# Patient Record
Sex: FEMALE | Race: WHITE | Employment: OTHER | URBAN - METROPOLITAN AREA
[De-identification: names, ages, dates, MRNs, and addresses within clinical notes are randomized per-mention and may not be internally consistent; named-entity substitution may affect disease eponyms.]

---

## 2017-03-24 RX ORDER — ESCITALOPRAM OXALATE 20 MG/1
TABLET ORAL
Qty: 30 TABLET | Refills: 1 | Status: SHIPPED | OUTPATIENT
Start: 2017-03-24 | End: 2017-05-26 | Stop reason: SDUPTHER

## 2017-04-26 DIAGNOSIS — I15.8 OTHER SECONDARY HYPERTENSION: ICD-10-CM

## 2017-04-26 RX ORDER — TRIAMTERENE AND HYDROCHLOROTHIAZIDE 37.5; 25 MG/1; MG/1
TABLET ORAL
Qty: 90 TABLET | Refills: 1 | Status: SHIPPED | OUTPATIENT
Start: 2017-04-26 | End: 2018-01-09 | Stop reason: SDUPTHER

## 2017-04-26 RX ORDER — ATENOLOL 50 MG/1
TABLET ORAL
Qty: 90 TABLET | Refills: 1 | Status: SHIPPED | OUTPATIENT
Start: 2017-04-26 | End: 2018-01-09 | Stop reason: SDUPTHER

## 2017-05-07 DIAGNOSIS — E03.8 OTHER SPECIFIED HYPOTHYROIDISM: ICD-10-CM

## 2017-05-08 RX ORDER — LEVOTHYROXINE SODIUM 0.07 MG/1
TABLET ORAL
Qty: 90 TABLET | Refills: 0 | Status: SHIPPED | OUTPATIENT
Start: 2017-05-08 | End: 2018-01-09 | Stop reason: SDUPTHER

## 2017-05-26 RX ORDER — ESCITALOPRAM OXALATE 20 MG/1
TABLET ORAL
Qty: 30 TABLET | Refills: 5 | Status: SHIPPED | OUTPATIENT
Start: 2017-05-26 | End: 2017-11-20 | Stop reason: SDUPTHER

## 2017-05-26 RX ORDER — ATORVASTATIN CALCIUM 80 MG/1
TABLET, FILM COATED ORAL
Qty: 90 TABLET | Refills: 1 | Status: SHIPPED | OUTPATIENT
Start: 2017-05-26 | End: 2018-01-09 | Stop reason: SDUPTHER

## 2017-11-20 RX ORDER — ESCITALOPRAM OXALATE 20 MG/1
TABLET ORAL
Qty: 30 TABLET | Refills: 5 | Status: SHIPPED | OUTPATIENT
Start: 2017-11-20 | End: 2018-01-09 | Stop reason: SDUPTHER

## 2018-01-09 ENCOUNTER — OFFICE VISIT (OUTPATIENT)
Dept: FAMILY MEDICINE CLINIC | Age: 68
End: 2018-01-09

## 2018-01-09 VITALS
TEMPERATURE: 97.7 F | DIASTOLIC BLOOD PRESSURE: 82 MMHG | RESPIRATION RATE: 14 BRPM | HEIGHT: 68 IN | SYSTOLIC BLOOD PRESSURE: 124 MMHG | OXYGEN SATURATION: 98 % | HEART RATE: 70 BPM | BODY MASS INDEX: 40.32 KG/M2 | WEIGHT: 266 LBS

## 2018-01-09 DIAGNOSIS — Z12.4 CERVICAL CANCER SCREENING: ICD-10-CM

## 2018-01-09 DIAGNOSIS — R73.9 HYPERGLYCEMIA: ICD-10-CM

## 2018-01-09 DIAGNOSIS — F32.A DEPRESSION, UNSPECIFIED DEPRESSION TYPE: ICD-10-CM

## 2018-01-09 DIAGNOSIS — E03.8 OTHER SPECIFIED HYPOTHYROIDISM: ICD-10-CM

## 2018-01-09 DIAGNOSIS — E78.2 MIXED HYPERLIPIDEMIA: ICD-10-CM

## 2018-01-09 DIAGNOSIS — I15.8 OTHER SECONDARY HYPERTENSION: Primary | ICD-10-CM

## 2018-01-09 DIAGNOSIS — Z91.81 AT HIGH RISK FOR FALLS: ICD-10-CM

## 2018-01-09 DIAGNOSIS — F41.9 ANXIETY: ICD-10-CM

## 2018-01-09 DIAGNOSIS — Z23 NEED FOR PNEUMOCOCCAL VACCINATION: ICD-10-CM

## 2018-01-09 DIAGNOSIS — I15.8 OTHER SECONDARY HYPERTENSION: ICD-10-CM

## 2018-01-09 DIAGNOSIS — Z12.31 ENCOUNTER FOR SCREENING MAMMOGRAM FOR BREAST CANCER: ICD-10-CM

## 2018-01-09 DIAGNOSIS — N18.30 CKD (CHRONIC KIDNEY DISEASE) STAGE 3, GFR 30-59 ML/MIN (HCC): ICD-10-CM

## 2018-01-09 LAB
ALBUMIN SERPL-MCNC: 4.7 G/DL (ref 3.9–4.9)
ALP BLD-CCNC: 149 U/L (ref 40–130)
ALT SERPL-CCNC: 23 U/L (ref 0–33)
ANION GAP SERPL CALCULATED.3IONS-SCNC: 15 MEQ/L (ref 7–13)
AST SERPL-CCNC: 18 U/L (ref 0–35)
BILIRUB SERPL-MCNC: 0.3 MG/DL (ref 0–1.2)
BUN BLDV-MCNC: 26 MG/DL (ref 8–23)
CALCIUM SERPL-MCNC: 10.3 MG/DL (ref 8.6–10.2)
CHLORIDE BLD-SCNC: 95 MEQ/L (ref 98–107)
CHOLESTEROL, TOTAL: 208 MG/DL (ref 0–199)
CO2: 29 MEQ/L (ref 22–29)
CREAT SERPL-MCNC: 1.25 MG/DL (ref 0.5–0.9)
GFR AFRICAN AMERICAN: 51.9
GFR NON-AFRICAN AMERICAN: 42.9
GLOBULIN: 2.8 G/DL (ref 2.3–3.5)
GLUCOSE BLD-MCNC: 91 MG/DL (ref 74–109)
HBA1C MFR BLD: 5.7 % (ref 4.8–5.9)
HCT VFR BLD CALC: 37.7 % (ref 37–47)
HDLC SERPL-MCNC: 55 MG/DL (ref 40–59)
HEMOGLOBIN: 12.1 G/DL (ref 12–16)
LDL CHOLESTEROL CALCULATED: 74 MG/DL (ref 0–129)
MCH RBC QN AUTO: 27.9 PG (ref 27–31.3)
MCHC RBC AUTO-ENTMCNC: 32.1 % (ref 33–37)
MCV RBC AUTO: 86.8 FL (ref 82–100)
PDW BLD-RTO: 15.2 % (ref 11.5–14.5)
PLATELET # BLD: 342 K/UL (ref 130–400)
POTASSIUM SERPL-SCNC: 4.5 MEQ/L (ref 3.5–5.1)
RBC # BLD: 4.34 M/UL (ref 4.2–5.4)
SODIUM BLD-SCNC: 139 MEQ/L (ref 132–144)
T4 FREE: 1.19 NG/DL (ref 0.93–1.7)
TOTAL PROTEIN: 7.5 G/DL (ref 6.4–8.1)
TRIGL SERPL-MCNC: 396 MG/DL (ref 0–200)
TSH SERPL DL<=0.05 MIU/L-ACNC: 2.23 UIU/ML (ref 0.27–4.2)
WBC # BLD: 10.4 K/UL (ref 4.8–10.8)

## 2018-01-09 PROCEDURE — 90471 IMMUNIZATION ADMIN: CPT | Performed by: FAMILY MEDICINE

## 2018-01-09 PROCEDURE — 90670 PCV13 VACCINE IM: CPT | Performed by: FAMILY MEDICINE

## 2018-01-09 PROCEDURE — 99397 PER PM REEVAL EST PAT 65+ YR: CPT | Performed by: FAMILY MEDICINE

## 2018-01-09 RX ORDER — ATORVASTATIN CALCIUM 80 MG/1
80 TABLET, FILM COATED ORAL DAILY
Qty: 90 TABLET | Refills: 3 | Status: SHIPPED | OUTPATIENT
Start: 2018-01-09 | End: 2018-03-29 | Stop reason: SDUPTHER

## 2018-01-09 RX ORDER — ESCITALOPRAM OXALATE 20 MG/1
20 TABLET ORAL DAILY
Qty: 90 TABLET | Refills: 3 | Status: SHIPPED | OUTPATIENT
Start: 2018-01-09 | End: 2018-03-29 | Stop reason: SDUPTHER

## 2018-01-09 RX ORDER — ATENOLOL 50 MG/1
50 TABLET ORAL DAILY
Qty: 90 TABLET | Refills: 3 | Status: SHIPPED | OUTPATIENT
Start: 2018-01-09 | End: 2018-03-29 | Stop reason: SDUPTHER

## 2018-01-09 RX ORDER — LEVOTHYROXINE SODIUM 0.07 MG/1
75 TABLET ORAL DAILY
Qty: 90 TABLET | Refills: 3 | Status: SHIPPED | OUTPATIENT
Start: 2018-01-09 | End: 2018-03-29 | Stop reason: SDUPTHER

## 2018-01-09 RX ORDER — TRIAMTERENE AND HYDROCHLOROTHIAZIDE 37.5; 25 MG/1; MG/1
1 TABLET ORAL DAILY
Qty: 90 TABLET | Refills: 3 | Status: SHIPPED | OUTPATIENT
Start: 2018-01-09 | End: 2018-03-29 | Stop reason: SDUPTHER

## 2018-01-09 ASSESSMENT — PATIENT HEALTH QUESTIONNAIRE - PHQ9
2. FEELING DOWN, DEPRESSED OR HOPELESS: 0
1. LITTLE INTEREST OR PLEASURE IN DOING THINGS: 0
SUM OF ALL RESPONSES TO PHQ9 QUESTIONS 1 & 2: 0
SUM OF ALL RESPONSES TO PHQ QUESTIONS 1-9: 0

## 2018-01-09 NOTE — PROGRESS NOTES
Subjective  Janell Davis, 74 y.o. female presents today with:  Chief Complaint   Patient presents with    Annual Exam     Colon ca screening utd    Hermina Flash on ice no falls otherwise  Mood is good    Past Medical History:   Diagnosis Date    Anxiety     Depression     Hyperlipidemia     Hypertension     Hypothyroidism     Obesity     Osteoarthritis     Palpitations     Skin cancer      Past Surgical History:   Procedure Laterality Date    COLONOSCOPY  11/16/11,5/2015    DR Paul Shah nxt    ENDOMETRIAL BIOPSY      MOHS SURGERY  2011    TONSILLECTOMY AND ADENOIDECTOMY  1955     Social History     Social History    Marital status:      Spouse name: N/A    Number of children: N/A    Years of education: N/A     Occupational History    Not on file. Social History Main Topics    Smoking status: Never Smoker    Smokeless tobacco: Never Used    Alcohol use Yes      Comment: occ.     Drug use: No    Sexual activity: Not on file     Other Topics Concern    Not on file     Social History Narrative    No narrative on file     Family History   Problem Relation Age of Onset    Alzheimer's Disease Mother     Parkinsonism Mother     Heart Disease Mother     Diabetes Father     Arthritis Father     Cancer Father     Heart Disease Father     Cancer Brother      COLON     Allergies   Allergen Reactions    Neosporin Ophthalmic [Neomycin-Polymyx-Gramicid]      Current Outpatient Prescriptions   Medication Sig Dispense Refill    escitalopram (LEXAPRO) 20 MG tablet TAKE ONE TABLET BY MOUTH ONE TIME DAILY 30 tablet 5    atorvastatin (LIPITOR) 80 MG tablet TAKE ONE TABLET BY MOUTH ONE TIME DAILY 90 tablet 1    levothyroxine (SYNTHROID) 75 MCG tablet TAKE ONE TABLET BY MOUTH ONE TIME DAILY 90 tablet 0    atenolol (TENORMIN) 50 MG tablet TAKE ONE TABLET BY MOUTH ONE TIME DAILY 90 tablet 1    triamterene-hydrochlorothiazide (MAXZIDE-25) 37.5-25 MG per tablet TAKE 1 TABLET BY MOUTH DAILY 90 tablet 1    carbonyl iron (FEOSOL) 45 MG TABS Take by mouth See Admin Instructions One a day 3 days per week      Coenzyme Q10 (CO Q 10 PO) Take by mouth      Cholecalciferol (VITAMIN D) 2000 UNITS CAPS capsule Take by mouth      Multiple Vitamins-Minerals (MULTIVITAMIN PO) Take by mouth      aspirin 81 MG tablet Take 81 mg by mouth daily. No current facility-administered medications for this visit. The patient denies any history of      seizures,             heart attack or KNOWN CAD        or stroke. No chest pain, shortness of breath, paroxysmal nocturnal dyspnea. No nausea, vomiting, diarrhea, hematochezia or melena. No paresthesias or headaches. No dysuria, frequency or hematuria. Last labs  No visits with results within 3 Month(s) from this visit.    Latest known visit with results is:   Hospital Outpatient Visit on 04/01/2016   Component Date Value Ref Range Status    WBC 04/01/2016 6.5  4.8 - 10.8 K/uL Final    RBC 04/01/2016 4.05* 4.20 - 5.40 M/uL Final    Hemoglobin 04/01/2016 11.6* 12.0 - 16.0 g/dL Final    Hematocrit 04/01/2016 34.7* 37.0 - 47.0 % Final    MCV 04/01/2016 85.6  82.0 - 100.0 fL Final    MCH 04/01/2016 28.7  27.0 - 31.3 pg Final    MCHC 04/01/2016 33.5  33.0 - 37.0 % Final    RDW 04/01/2016 15.1* 11.5 - 14.5 % Final    Platelets 81/52/2058 285  130 - 400 K/uL Final    Neutrophils % 04/01/2016 71.8  % Final    Lymphocytes % 04/01/2016 13.7  % Final    Monocytes % 04/01/2016 10.5  % Final    Eosinophils % 04/01/2016 2.7  % Final    Basophils % 04/01/2016 1.3  % Final    Neutrophils # 04/01/2016 4.7  1.4 - 6.5 K/uL Final    Lymphocytes # 04/01/2016 0.9* 1.0 - 4.8 K/uL Final    Monocytes # 04/01/2016 0.7  0.2 - 0.8 K/uL Final    Eosinophils # 04/01/2016 0.2  0.0 - 0.7 K/uL Final    Basophils # 04/01/2016 0.1  0.0 - 0.2 K/uL Final    T4 Free 04/01/2016 1.14  0.93 - 1.70 ng/dL Final    TSH 04/01/2016 2.800  0.270 - 4.200 uIU/mL Final bilaterally        Breast exam no masses, lymphadenopathy, rashes or nipple discharge bilaterally. Pap no cmt no discharge    Assessment & Plan   1. Other secondary hypertension  atenolol (TENORMIN) 50 MG tablet    triamterene-hydrochlorothiazide (MAXZIDE-25) 37.5-25 MG per tablet   2. Other specified hypothyroidism  levothyroxine (SYNTHROID) 75 MCG tablet   3. Mixed hyperlipidemia     4. CKD (chronic kidney disease) stage 3, GFR 30-59 ml/min     5. Anxiety     6. Depression, unspecified depression type     7. Encounter for screening mammogram for breast cancer  LOUISE DIGITAL SCREEN W CAD BILATERAL   8. Need for pneumococcal vaccination  Pneumococcal conjugate vaccine 13-valent IM (PREVNAR 13)     Orders Placed This Encounter   Procedures    Pneumococcal conjugate vaccine 13-valent IM (PREVNAR 13)     No orders of the defined types were placed in this encounter. Medications Discontinued During This Encounter   Medication Reason    ANUCORT-HC 25 MG suppository Therapy completed    levothyroxine (SYNTHROID) 75 MCG tablet Therapy completed     No Follow-up on file. Marcos Day is advised to follow up ASAP if condition deteriorates or problems arise and if no information on test results to patient in the next 1 month they are advised to call us. Marcin Rea MD      On the basis of positive falls risk screening, assessment and plan is as follows: home safety tips provided.

## 2018-01-16 LAB
HPV COMMENT: NORMAL
HPV TYPE 16: NOT DETECTED
HPV TYPE 18: NOT DETECTED
HPVOH (OTHER TYPES): NOT DETECTED

## 2018-01-30 ENCOUNTER — HOSPITAL ENCOUNTER (OUTPATIENT)
Dept: WOMENS IMAGING | Age: 68
Discharge: HOME | End: 2018-02-01
Payer: COMMERCIAL

## 2018-01-30 DIAGNOSIS — Z12.31 ENCOUNTER FOR SCREENING MAMMOGRAM FOR BREAST CANCER: ICD-10-CM

## 2018-01-30 PROCEDURE — 77063 BREAST TOMOSYNTHESIS BI: CPT

## 2018-03-29 DIAGNOSIS — E03.8 OTHER SPECIFIED HYPOTHYROIDISM: ICD-10-CM

## 2018-03-29 DIAGNOSIS — I15.8 OTHER SECONDARY HYPERTENSION: ICD-10-CM

## 2018-03-30 RX ORDER — LEVOTHYROXINE SODIUM 0.07 MG/1
75 TABLET ORAL DAILY
Qty: 90 TABLET | Refills: 3 | Status: SHIPPED | OUTPATIENT
Start: 2018-03-30 | End: 2019-02-20 | Stop reason: SDUPTHER

## 2018-03-30 RX ORDER — ATORVASTATIN CALCIUM 80 MG/1
80 TABLET, FILM COATED ORAL DAILY
Qty: 90 TABLET | Refills: 3 | Status: SHIPPED | OUTPATIENT
Start: 2018-03-30 | End: 2019-02-20 | Stop reason: SDUPTHER

## 2018-03-30 RX ORDER — ESCITALOPRAM OXALATE 20 MG/1
20 TABLET ORAL DAILY
Qty: 90 TABLET | Refills: 3 | Status: SHIPPED | OUTPATIENT
Start: 2018-03-30 | End: 2019-02-20 | Stop reason: SDUPTHER

## 2018-03-30 RX ORDER — ATENOLOL 50 MG/1
50 TABLET ORAL DAILY
Qty: 90 TABLET | Refills: 3 | Status: SHIPPED | OUTPATIENT
Start: 2018-03-30 | End: 2019-02-20 | Stop reason: SDUPTHER

## 2018-03-30 RX ORDER — TRIAMTERENE AND HYDROCHLOROTHIAZIDE 37.5; 25 MG/1; MG/1
1 TABLET ORAL DAILY
Qty: 90 TABLET | Refills: 3 | Status: SHIPPED | OUTPATIENT
Start: 2018-03-30 | End: 2019-02-20 | Stop reason: SDUPTHER

## 2018-08-29 ENCOUNTER — OFFICE VISIT (OUTPATIENT)
Dept: FAMILY MEDICINE CLINIC | Age: 68
End: 2018-08-29
Payer: COMMERCIAL

## 2018-08-29 VITALS
HEIGHT: 68 IN | SYSTOLIC BLOOD PRESSURE: 128 MMHG | RESPIRATION RATE: 16 BRPM | HEART RATE: 76 BPM | TEMPERATURE: 98.4 F | DIASTOLIC BLOOD PRESSURE: 64 MMHG | WEIGHT: 265 LBS | BODY MASS INDEX: 40.16 KG/M2

## 2018-08-29 DIAGNOSIS — E78.2 MIXED HYPERLIPIDEMIA: ICD-10-CM

## 2018-08-29 DIAGNOSIS — N18.30 CKD (CHRONIC KIDNEY DISEASE) STAGE 3, GFR 30-59 ML/MIN (HCC): ICD-10-CM

## 2018-08-29 DIAGNOSIS — I15.8 OTHER SECONDARY HYPERTENSION: ICD-10-CM

## 2018-08-29 DIAGNOSIS — I15.8 OTHER SECONDARY HYPERTENSION: Primary | ICD-10-CM

## 2018-08-29 DIAGNOSIS — E03.8 OTHER SPECIFIED HYPOTHYROIDISM: ICD-10-CM

## 2018-08-29 DIAGNOSIS — R73.9 HYPERGLYCEMIA: ICD-10-CM

## 2018-08-29 LAB
ALBUMIN SERPL-MCNC: 4.4 G/DL (ref 3.9–4.9)
ALP BLD-CCNC: 177 U/L (ref 40–130)
ALT SERPL-CCNC: 22 U/L (ref 0–33)
ANION GAP SERPL CALCULATED.3IONS-SCNC: 20 MEQ/L (ref 7–13)
AST SERPL-CCNC: 19 U/L (ref 0–35)
BILIRUB SERPL-MCNC: 0.4 MG/DL (ref 0–1.2)
BUN BLDV-MCNC: 23 MG/DL (ref 8–23)
CALCIUM SERPL-MCNC: 10 MG/DL (ref 8.6–10.2)
CHLORIDE BLD-SCNC: 100 MEQ/L (ref 98–107)
CHOLESTEROL, TOTAL: 191 MG/DL (ref 0–199)
CO2: 24 MEQ/L (ref 22–29)
CREAT SERPL-MCNC: 1.07 MG/DL (ref 0.5–0.9)
GFR AFRICAN AMERICAN: >60
GFR NON-AFRICAN AMERICAN: 51.2
GLOBULIN: 2.9 G/DL (ref 2.3–3.5)
GLUCOSE BLD-MCNC: 99 MG/DL (ref 74–109)
HBA1C MFR BLD: 5.7 % (ref 4.8–5.9)
HCT VFR BLD CALC: 36.5 % (ref 37–47)
HDLC SERPL-MCNC: 55 MG/DL (ref 40–59)
HEMOGLOBIN: 12.3 G/DL (ref 12–16)
LDL CHOLESTEROL CALCULATED: 87 MG/DL (ref 0–129)
MCH RBC QN AUTO: 28.6 PG (ref 27–31.3)
MCHC RBC AUTO-ENTMCNC: 33.6 % (ref 33–37)
MCV RBC AUTO: 85.2 FL (ref 82–100)
PDW BLD-RTO: 15.9 % (ref 11.5–14.5)
PLATELET # BLD: 292 K/UL (ref 130–400)
POTASSIUM SERPL-SCNC: 4.7 MEQ/L (ref 3.5–5.1)
RBC # BLD: 4.28 M/UL (ref 4.2–5.4)
SODIUM BLD-SCNC: 144 MEQ/L (ref 132–144)
T4 FREE: 1.03 NG/DL (ref 0.93–1.7)
TOTAL PROTEIN: 7.3 G/DL (ref 6.4–8.1)
TRIGL SERPL-MCNC: 246 MG/DL (ref 0–200)
TSH SERPL DL<=0.05 MIU/L-ACNC: 3.26 UIU/ML (ref 0.27–4.2)
WBC # BLD: 8.3 K/UL (ref 4.8–10.8)

## 2018-08-29 PROCEDURE — 99214 OFFICE O/P EST MOD 30 MIN: CPT | Performed by: FAMILY MEDICINE

## 2018-08-29 NOTE — PROGRESS NOTES
tablet Take 1 tablet by mouth daily 90 tablet 3    atorvastatin (LIPITOR) 80 MG tablet Take 1 tablet by mouth daily 90 tablet 3    escitalopram (LEXAPRO) 20 MG tablet Take 1 tablet by mouth daily 90 tablet 3    levothyroxine (SYNTHROID) 75 MCG tablet Take 1 tablet by mouth daily 90 tablet 3    carbonyl iron (FEOSOL) 45 MG TABS Take by mouth See Admin Instructions One a day 3 days per week      Coenzyme Q10 (CO Q 10 PO) Take by mouth      Cholecalciferol (VITAMIN D) 2000 UNITS CAPS capsule Take by mouth      Multiple Vitamins-Minerals (MULTIVITAMIN PO) Take by mouth      aspirin 81 MG tablet Take 81 mg by mouth daily. No current facility-administered medications for this visit. The patient denies any history of      seizures,             heart attack or KNOWN CAD        or stroke. No chest pain, shortness of breath, paroxysmal nocturnal dyspnea. No nausea, vomiting, diarrhea, hematochezia or melena. No paresthesias or headaches. No dysuria, frequency or hematuria. Last labs  No visits with results within 3 Month(s) from this visit. Latest known visit with results is:   Orders Only on 01/09/2018   Component Date Value Ref Range Status    Cholesterol, Total 01/09/2018 208* 0 - 199 mg/dL Final    ATP III Cholesterol Classification is Borderline High.  Triglycerides 01/09/2018 396* 0 - 200 mg/dL Final    ATP III Triglycerides Classification is High.  HDL 01/09/2018 55  40 - 59 mg/dL Final    Comment: ATP III HDL Cholesterol Classification is Desirable. Expected Values:    Males:    >55 = No Risk            35-55 = Moderate Risk            <35 = High Risk    Females:  >65 = No Risk            45-65 = Moderate Risk            <45 = High Risk    NCEP Guidelines:   Third Report May 2001  >59 = negative risk factor for CHD  <40 = major risk factor for CHD      LDL Calculated 01/09/2018 74  0 - 129 mg/dL Final    ATP III LDL Classification is Optimal.    WBC 01/09/2018 10.4 4.8 - 10.8 K/uL Final    RBC 01/09/2018 4.34  4.20 - 5.40 M/uL Final    Hemoglobin 01/09/2018 12.1  12.0 - 16.0 g/dL Final    Hematocrit 01/09/2018 37.7  37.0 - 47.0 % Final    MCV 01/09/2018 86.8  82.0 - 100.0 fL Final    MCH 01/09/2018 27.9  27.0 - 31.3 pg Final    MCHC 01/09/2018 32.1* 33.0 - 37.0 % Final    RDW 01/09/2018 15.2* 11.5 - 14.5 % Final    Platelets 00/96/3646 342  130 - 400 K/uL Final    Hemoglobin A1C 01/09/2018 5.7  4.8 - 5.9 % Final    Sodium 01/09/2018 139  132 - 144 mEq/L Final    Comment: Revert to previous reference range. Effective:  12/14/2017      Potassium 01/09/2018 4.5  3.5 - 5.1 mEq/L Final    Comment: Revert to previous reference range. Effective:  12/14/2017      Chloride 01/09/2018 95* 98 - 107 mEq/L Final    Comment: Revert to previous reference range. Effective:  12/14/2017      CO2 01/09/2018 29  22 - 29 mEq/L Final    Comment: Revert to previous reference range. Effective:  12/14/2017      Anion Gap 01/09/2018 15* 7 - 13 mEq/L Final    Comment: Revert to previous reference range. Effective:  12/14/2017      Glucose 01/09/2018 91  74 - 109 mg/dL Final    BUN 01/09/2018 26* 8 - 23 mg/dL Final    CREATININE 01/09/2018 1.25* 0.50 - 0.90 mg/dL Final    GFR Non- 01/09/2018 42.9* >60 Final    Comment: >60 mL/min/1.73m2 EGFR, calc. for ages 25 and older using the  MDRD formula (not corrected for weight), is valid for stable  renal function.  GFR  01/09/2018 51.9* >60 Final    Comment: >60 mL/min/1.73m2 EGFR, calc. for ages 25 and older using the  MDRD formula (not corrected for weight), is valid for stable  renal function.       Calcium 01/09/2018 10.3* 8.6 - 10.2 mg/dL Final    Total Protein 01/09/2018 7.5  6.4 - 8.1 g/dL Final    Alb 01/09/2018 4.7  3.9 - 4.9 g/dL Final    Total Bilirubin 01/09/2018 0.3  0.0 - 1.2 mg/dL Final    Alkaline Phosphatase 01/09/2018 149* 40 - 130 U/L Final    ALT 01/09/2018 23  0 - 33 U/L Final    AST 01/09/2018 18  0 - 35 U/L Final    Globulin 01/09/2018 2.8  2.3 - 3.5 g/dL Final    TSH 01/09/2018 2.230  0.270 - 4.200 uIU/mL Final    T4 Free 01/09/2018 1.19  0.93 - 1.70 ng/dL Final    HPV TYPE 16 01/09/2018 Not Detected  Not Detected Final    HPV TYPE 18 01/09/2018 Not Detected  Not Detected Final    HPVOH (OTHER TYPES) 01/09/2018 Not Detected  Not Detected Final    *Includes 30,45,89,77,31,79,85,05,73,06,77,18 genotypes    HPV Comment 01/09/2018 See below   Final    Comment: **This is information only. See above for results. **    HPV other genotypes: 34,47,89,71,79,68,83,72,52,02,26,33    The Roche [facility] HPV Test is a qualitative in-vitro test for  the detection of Human Papillomavirus that provides specific  genotyping information for HPV Types 16 and 18, while  concurrently detecting 12 other high-risk HPV types 31,33,35,  85,71,64,92,28,68,28,66,68 in a pooled result. The test  utilizes amplification of target DNA by Polymerase Chain  Reaction (PCR) and nucleic acid hybridization. [facility]   Topic Date Due    Hepatitis C screen  1950    Shingles Vaccine (1 of 2 - 2 Dose Series) 07/20/2014    DEXA (modify frequency per FRAX score)  01/09/2019 (Originally 4/19/2017)    DTaP/Tdap/Td vaccine (1 - Tdap) 01/09/2019 (Originally 4/19/1971)    Flu vaccine (1) 09/01/2018    A1C test (Diabetic or Prediabetic)  01/09/2019    Pneumococcal low/med risk (2 of 2 - PPSV23) 01/09/2019    TSH testing  01/09/2019    Potassium monitoring  01/09/2019    Creatinine monitoring  01/09/2019    Breast cancer screen  01/30/2020    Colon cancer screen colonoscopy  05/05/2020    Lipid screen  01/09/2023       No results found for this visit on 08/29/18.       Objective      Wt Readings from Last 3 Encounters:   08/29/18 265 lb (120.2 kg)   01/09/18 266 lb (120.7 kg)   05/03/16 260 lb (117.9 kg)     Temp Readings from Last 3 Encounters:   08/29/18 98.4 °F (36.9 °C) (Tympanic)

## 2018-12-17 DIAGNOSIS — E78.2 MIXED HYPERLIPIDEMIA: ICD-10-CM

## 2018-12-17 DIAGNOSIS — I15.8 OTHER SECONDARY HYPERTENSION: Primary | ICD-10-CM

## 2018-12-17 DIAGNOSIS — E03.8 OTHER SPECIFIED HYPOTHYROIDISM: ICD-10-CM

## 2018-12-18 DIAGNOSIS — I15.8 OTHER SECONDARY HYPERTENSION: ICD-10-CM

## 2018-12-18 DIAGNOSIS — E03.8 OTHER SPECIFIED HYPOTHYROIDISM: ICD-10-CM

## 2018-12-18 DIAGNOSIS — E78.2 MIXED HYPERLIPIDEMIA: ICD-10-CM

## 2018-12-18 LAB
ALBUMIN SERPL-MCNC: 4.5 G/DL (ref 3.9–4.9)
ALP BLD-CCNC: 147 U/L (ref 40–130)
ALT SERPL-CCNC: 24 U/L (ref 0–33)
ANION GAP SERPL CALCULATED.3IONS-SCNC: 15 MEQ/L (ref 7–13)
AST SERPL-CCNC: 19 U/L (ref 0–35)
BILIRUB SERPL-MCNC: 0.4 MG/DL (ref 0–1.2)
BUN BLDV-MCNC: 27 MG/DL (ref 8–23)
CALCIUM SERPL-MCNC: 9.8 MG/DL (ref 8.6–10.2)
CHLORIDE BLD-SCNC: 99 MEQ/L (ref 98–107)
CHOLESTEROL, TOTAL: 187 MG/DL (ref 0–199)
CO2: 28 MEQ/L (ref 22–29)
CREAT SERPL-MCNC: 1.09 MG/DL (ref 0.5–0.9)
GFR AFRICAN AMERICAN: >60
GFR NON-AFRICAN AMERICAN: 50.1
GLOBULIN: 2.8 G/DL (ref 2.3–3.5)
GLUCOSE BLD-MCNC: 107 MG/DL (ref 74–109)
HDLC SERPL-MCNC: 57 MG/DL (ref 40–59)
LDL CHOLESTEROL CALCULATED: 67 MG/DL (ref 0–129)
POTASSIUM SERPL-SCNC: 4 MEQ/L (ref 3.5–5.1)
SODIUM BLD-SCNC: 142 MEQ/L (ref 132–144)
T4 FREE: 1 NG/DL (ref 0.93–1.7)
TOTAL PROTEIN: 7.3 G/DL (ref 6.4–8.1)
TRIGL SERPL-MCNC: 316 MG/DL (ref 0–200)
TSH SERPL DL<=0.05 MIU/L-ACNC: 2.89 UIU/ML (ref 0.27–4.2)

## 2019-02-20 DIAGNOSIS — I15.8 OTHER SECONDARY HYPERTENSION: ICD-10-CM

## 2019-02-20 DIAGNOSIS — E03.8 OTHER SPECIFIED HYPOTHYROIDISM: ICD-10-CM

## 2019-02-20 RX ORDER — LEVOTHYROXINE SODIUM 0.07 MG/1
TABLET ORAL
Qty: 90 TABLET | Refills: 3 | Status: SHIPPED | OUTPATIENT
Start: 2019-02-20

## 2019-02-20 RX ORDER — ATORVASTATIN CALCIUM 80 MG/1
TABLET, FILM COATED ORAL
Qty: 90 TABLET | Refills: 3 | Status: SHIPPED | OUTPATIENT
Start: 2019-02-20

## 2019-02-20 RX ORDER — ESCITALOPRAM OXALATE 20 MG/1
TABLET ORAL
Qty: 90 TABLET | Refills: 3 | Status: SHIPPED | OUTPATIENT
Start: 2019-02-20

## 2019-02-20 RX ORDER — TRIAMTERENE AND HYDROCHLOROTHIAZIDE 37.5; 25 MG/1; MG/1
TABLET ORAL
Qty: 90 TABLET | Refills: 3 | Status: SHIPPED | OUTPATIENT
Start: 2019-02-20

## 2019-02-20 RX ORDER — ATENOLOL 50 MG/1
TABLET ORAL
Qty: 90 TABLET | Refills: 3 | Status: SHIPPED | OUTPATIENT
Start: 2019-02-20

## 2019-04-17 ENCOUNTER — TELEPHONE (OUTPATIENT)
Dept: FAMILY MEDICINE CLINIC | Age: 69
End: 2019-04-17

## 2019-09-17 ENCOUNTER — HOSPITAL ENCOUNTER (OUTPATIENT)
Dept: WOMENS IMAGING | Age: 69
Discharge: HOME | End: 2019-09-19
Payer: COMMERCIAL

## 2019-09-17 DIAGNOSIS — Z12.31 ENCOUNTER FOR SCREENING MAMMOGRAM FOR BREAST CANCER: ICD-10-CM

## 2019-09-17 PROCEDURE — 77063 BREAST TOMOSYNTHESIS BI: CPT

## 2020-09-28 ENCOUNTER — TELEPHONE (OUTPATIENT)
Dept: ENDOSCOPY | Age: 70
End: 2020-09-28

## 2020-09-28 NOTE — TELEPHONE ENCOUNTER
called in prescription for Trilyte bowel prep kit to [facility] in [place] 9/28/2020 at 11:19am

## 2020-09-30 ENCOUNTER — HOSPITAL ENCOUNTER (OUTPATIENT)
Dept: WOMENS IMAGING | Age: 70
Discharge: HOME | End: 2020-10-02
Payer: MEDICARE

## 2020-09-30 PROCEDURE — 77063 BREAST TOMOSYNTHESIS BI: CPT

## 2020-10-06 ENCOUNTER — HOSPITAL ENCOUNTER (OUTPATIENT)
Age: 70
Setting detail: SPECIMEN
Discharge: HOME | End: 2020-10-06
Payer: COMMERCIAL

## 2020-10-06 ENCOUNTER — NURSE ONLY (OUTPATIENT)
Dept: PRIMARY CARE CLINIC | Age: 70
End: 2020-10-06

## 2020-10-06 PROCEDURE — U0003 INFECTIOUS AGENT DETECTION BY NUCLEIC ACID (DNA OR RNA); SEVERE ACUTE RESPIRATORY SYNDROME CORONAVIRUS 2 (SARS-COV-2) (CORONAVIRUS DISEASE [COVID-19]), AMPLIFIED PROBE TECHNIQUE, MAKING USE OF HIGH THROUGHPUT TECHNOLOGIES AS DESCRIBED BY CMS-2020-01-R: HCPCS

## 2020-10-08 LAB
SARS-COV-2: NOT DETECTED
SOURCE: NORMAL

## 2020-10-12 ENCOUNTER — ANESTHESIA EVENT (OUTPATIENT)
Dept: ENDOSCOPY | Age: 70
End: 2020-10-12
Payer: MEDICARE

## 2020-10-12 NOTE — ANESTHESIA PRE PROCEDURE
Department of Anesthesiology  Preprocedure Note       Name:  Javier Paige   Age:  78 y.o.  :  1950                                          MRN:  [id number]         Date:  10/12/2020      Surgeon: Lupe Palomares):  Kae Serrano MD    Procedure: Procedure(s):  COLORECTAL CANCER SCREENING, HIGH RISK    Medications prior to admission:   Prior to Admission medications    Medication Sig Start Date End Date Taking? Authorizing Provider   metFORMIN (GLUCOPHAGE) 500 MG tablet TAKE 1 TABLET DAILY WITH BREAKFAST 3/4/19   Sasha Stiles MD   atenolol (TENORMIN) 50 MG tablet TAKE 1 TABLET DAILY 19   Sasha Stiles MD   atorvastatin (LIPITOR) 80 MG tablet TAKE 1 TABLET DAILY 19   Sasha Stiles MD   escitalopram (LEXAPRO) 20 MG tablet TAKE 1 TABLET DAILY 19   Sasha Stiles MD   levothyroxine (SYNTHROID) 75 MCG tablet TAKE 1 TABLET DAILY 19   Sasha Stiles MD   triamterene-hydrochlorothiazide [facility]) 37.5-25 MG per tablet TAKE 1 TABLET DAILY 19   Sasha Stiles MD   carbonyl iron (FEOSOL) 45 MG TABS Take by mouth See Admin Instructions One a day 3 days per week    Historical Provider, MD   Coenzyme Q10 (CO Q 10 PO) Take by mouth    Historical Provider, MD   Cholecalciferol (VITAMIN D) 2000 UNITS CAPS capsule Take by mouth    Historical Provider, MD   Multiple Vitamins-Minerals (MULTIVITAMIN PO) Take by mouth    Historical Provider, MD   aspirin 81 MG tablet Take 81 mg by mouth daily. Historical Provider, MD       Current medications:    No current facility-administered medications for this encounter.       Current Outpatient Medications   Medication Sig Dispense Refill    metFORMIN (GLUCOPHAGE) 500 MG tablet TAKE 1 TABLET DAILY WITH BREAKFAST 90 tablet 0    atenolol (TENORMIN) 50 MG tablet TAKE 1 TABLET DAILY 90 tablet 3    atorvastatin (LIPITOR) 80 MG tablet TAKE 1 TABLET DAILY 90 tablet 3    escitalopram (LEXAPRO) 20 MG tablet TAKE 1 TABLET DAILY 90 tablet 3    levothyroxine (SYNTHROID) 75 MCG tablet TAKE 1 TABLET DAILY 90 tablet 3    triamterene-hydrochlorothiazide (MAXZIDE-25) 37.5-25 MG per tablet TAKE 1 TABLET DAILY 90 tablet 3    carbonyl iron (FEOSOL) 45 MG TABS Take by mouth See Admin Instructions One a day 3 days per week      Coenzyme Q10 (CO Q 10 PO) Take by mouth      Cholecalciferol (VITAMIN D) 2000 UNITS CAPS capsule Take by mouth      Multiple Vitamins-Minerals (MULTIVITAMIN PO) Take by mouth      aspirin 81 MG tablet Take 81 mg by mouth daily. Allergies: Allergies   Allergen Reactions    Neosporin Ophthalmic [Neomycin-Polymyx-Gramicid]        Problem List:    Patient Active Problem List   Diagnosis Code    Hypothyroidism E03.9    Hypertension I10    Mixed hyperlipidemia E78.2    Anxiety F41.9    Depression F32.9    Obesity E66.9    Osteoarthritis M19.90    Varicose veins I83.90    Degenerative cervical disc M50.30    CKD (chronic kidney disease) stage 3, GFR 30-59 ml/min N18.30       Past Medical History:        Diagnosis Date    Anxiety     Depression     Hyperlipidemia     Hypertension     Hypothyroidism     Obesity     Osteoarthritis     Palpitations     Skin cancer        Past Surgical History:        Procedure Laterality Date    COLONOSCOPY  11/16/11,5/2015    DR Ina Dumont nxt    ENDOMETRIAL BIOPSY      MOHS SURGERY  2011    TONSILLECTOMY AND ADENOIDECTOMY  1955       Social History:    Social History     Tobacco Use    Smoking status: Never Smoker    Smokeless tobacco: Never Used   Substance Use Topics    Alcohol use: Yes     Comment: occ. Counseling given: Not Answered      Vital Signs (Current): There were no vitals filed for this visit.                                            BP Readings from Last 3 Encounters:   08/29/18 128/64   01/09/18 124/82   05/25/16 126/68       NPO Status: BMI:   Wt Readings from Last 3 Encounters:   08/29/18 265 lb (120.2 kg)   01/09/18 266 lb (120.7 kg)   05/03/16 260 lb (117.9 kg)     There is no height or weight on file to calculate BMI.    CBC:   Lab Results   Component Value Date    WBC 8.3 08/29/2018    RBC 4.28 08/29/2018    HGB 12.3 08/29/2018    HCT 36.5 08/29/2018    MCV 85.2 08/29/2018    RDW 15.9 08/29/2018     08/29/2018       CMP:   Lab Results   Component Value Date     12/18/2018    K 4.0 12/18/2018    CL 99 12/18/2018    CO2 28 12/18/2018    BUN 27 12/18/2018    CREATININE 1.09 12/18/2018    GFRAA >60.0 12/18/2018    LABGLOM 50.1 12/18/2018    GLUCOSE 107 12/18/2018    PROT 7.3 12/18/2018    CALCIUM 9.8 12/18/2018    BILITOT 0.4 12/18/2018    ALKPHOS 147 12/18/2018    AST 19 12/18/2018    ALT 24 12/18/2018       POC Tests: No results for input(s): POCGLU, POCNA, POCK, POCCL, POCBUN, POCHEMO, POCHCT in the last 72 hours.     Coags: No results found for: PROTIME, INR, APTT    HCG (If Applicable): No results found for: PREGTESTUR, PREGSERUM, HCG, HCGQUANT     ABGs: No results found for: PHART, PO2ART, JPI1NIF, SEK1PFR, BEART, D0JHROHI     Type & Screen (If Applicable):  No results found for: LABABO, LABRH    Drug/Infectious Status (If Applicable):  No results found for: HIV, HEPCAB    COVID-19 Screening (If Applicable):   Lab Results   Component Value Date    COVID19 Not Detected 10/06/2020         Anesthesia Evaluation    Airway: Mallampati: II  TM distance: >3 FB   Neck ROM: full  Mouth opening: > = 3 FB Dental:          Pulmonary:Negative Pulmonary ROS and normal exam                               Cardiovascular:    (+) hypertension:, hyperlipidemia        Rhythm: regular  Rate: normal                    Neuro/Psych:   (+) depression/anxiety             GI/Hepatic/Renal:   (+) renal disease: CRI, bowel prep, morbid obesity          Endo/Other:    (+) hypothyroidism: arthritis: OA., .                 Abdominal:   (+) obese, Vascular: negative vascular ROS. Anesthesia Plan      MAC     ASA 3       Induction: intravenous. Anesthetic plan and risks discussed with patient. Plan discussed with attending.                   HUA Benoit - CRNA   10/12/2020

## 2020-10-13 ENCOUNTER — HOSPITAL ENCOUNTER (OUTPATIENT)
Age: 70
Setting detail: OUTPATIENT SURGERY
Discharge: HOME | End: 2020-10-13
Attending: SPECIALIST | Admitting: SPECIALIST
Payer: MEDICARE

## 2020-10-13 ENCOUNTER — ANCILLARY PROCEDURE (OUTPATIENT)
Dept: ENDOSCOPY | Age: 70
End: 2020-10-13
Attending: SPECIALIST
Payer: MEDICARE

## 2020-10-13 ENCOUNTER — ANESTHESIA (OUTPATIENT)
Dept: ENDOSCOPY | Age: 70
End: 2020-10-13
Payer: MEDICARE

## 2020-10-13 VITALS
DIASTOLIC BLOOD PRESSURE: 55 MMHG | OXYGEN SATURATION: 96 % | TEMPERATURE: 97.9 F | HEART RATE: 71 BPM | HEIGHT: 68 IN | RESPIRATION RATE: 16 BRPM | WEIGHT: 280 LBS | BODY MASS INDEX: 42.44 KG/M2 | SYSTOLIC BLOOD PRESSURE: 120 MMHG

## 2020-10-13 VITALS
SYSTOLIC BLOOD PRESSURE: 94 MMHG | OXYGEN SATURATION: 97 % | RESPIRATION RATE: 13 BRPM | DIASTOLIC BLOOD PRESSURE: 52 MMHG

## 2020-10-13 LAB
GLUCOSE BLD-MCNC: 133 MG/DL (ref 60–115)
PERFORMED ON: ABNORMAL

## 2020-10-13 PROCEDURE — 2709999900 HC NON-CHARGEABLE SUPPLY: Performed by: SPECIALIST

## 2020-10-13 PROCEDURE — 2500000003 HC RX 250 WO HCPCS: Performed by: NURSE ANESTHETIST, CERTIFIED REGISTERED

## 2020-10-13 PROCEDURE — 7100000011 HC PHASE II RECOVERY - ADDTL 15 MIN: Performed by: SPECIALIST

## 2020-10-13 PROCEDURE — 6360000002 HC RX W HCPCS: Performed by: NURSE ANESTHETIST, CERTIFIED REGISTERED

## 2020-10-13 PROCEDURE — 88305 TISSUE EXAM BY PATHOLOGIST: CPT

## 2020-10-13 PROCEDURE — 3609027000 HC COLONOSCOPY: Performed by: SPECIALIST

## 2020-10-13 PROCEDURE — 3700000000 HC ANESTHESIA ATTENDED CARE: Performed by: SPECIALIST

## 2020-10-13 PROCEDURE — 2580000003 HC RX 258: Performed by: SPECIALIST

## 2020-10-13 PROCEDURE — 6370000000 HC RX 637 (ALT 250 FOR IP): Performed by: SPECIALIST

## 2020-10-13 PROCEDURE — 7100000010 HC PHASE II RECOVERY - FIRST 15 MIN: Performed by: SPECIALIST

## 2020-10-13 PROCEDURE — 45380 COLONOSCOPY AND BIOPSY: CPT | Performed by: SPECIALIST

## 2020-10-13 PROCEDURE — 3700000001 HC ADD 15 MINUTES (ANESTHESIA): Performed by: SPECIALIST

## 2020-10-13 RX ORDER — LIDOCAINE HYDROCHLORIDE 20 MG/ML
INJECTION, SOLUTION INFILTRATION; PERINEURAL PRN
Status: DISCONTINUED | OUTPATIENT
Start: 2020-10-13 | End: 2020-10-13 | Stop reason: SDUPTHER

## 2020-10-13 RX ORDER — 0.9 % SODIUM CHLORIDE 0.9 %
500 INTRAVENOUS SOLUTION INTRAVENOUS ONCE
Status: COMPLETED | OUTPATIENT
Start: 2020-10-13 | End: 2020-10-13

## 2020-10-13 RX ORDER — PROPOFOL 10 MG/ML
INJECTION, EMULSION INTRAVENOUS PRN
Status: DISCONTINUED | OUTPATIENT
Start: 2020-10-13 | End: 2020-10-13 | Stop reason: SDUPTHER

## 2020-10-13 RX ORDER — MAGNESIUM HYDROXIDE 1200 MG/15ML
LIQUID ORAL PRN
Status: DISCONTINUED | OUTPATIENT
Start: 2020-10-13 | End: 2020-10-13 | Stop reason: ALTCHOICE

## 2020-10-13 RX ORDER — SIMETHICONE 20 MG/.3ML
EMULSION ORAL PRN
Status: DISCONTINUED | OUTPATIENT
Start: 2020-10-13 | End: 2020-10-13 | Stop reason: ALTCHOICE

## 2020-10-13 RX ORDER — GLYCOPYRROLATE 1 MG/5 ML
SYRINGE (ML) INTRAVENOUS PRN
Status: DISCONTINUED | OUTPATIENT
Start: 2020-10-13 | End: 2020-10-13 | Stop reason: SDUPTHER

## 2020-10-13 RX ADMIN — PROPOFOL 300 MG: 10 INJECTION, EMULSION INTRAVENOUS at 07:35

## 2020-10-13 RX ADMIN — SODIUM CHLORIDE 500 ML: 9 INJECTION, SOLUTION INTRAVENOUS at 07:05

## 2020-10-13 RX ADMIN — LIDOCAINE HYDROCHLORIDE 60 MG: 20 INJECTION, SOLUTION INFILTRATION; PERINEURAL at 07:35

## 2020-10-13 RX ADMIN — Medication 0.2 MG: at 07:40

## 2020-10-13 ASSESSMENT — PAIN - FUNCTIONAL ASSESSMENT: PAIN_FUNCTIONAL_ASSESSMENT: 0-10

## 2020-10-13 NOTE — ANESTHESIA POSTPROCEDURE EVALUATION
Department of Anesthesiology  Postprocedure Note    Patient: Ammy Rivera  MRN: [id number]  YOB: 1950  Date of evaluation: 10/13/2020  Time:  7:57 AM     Procedure Summary     Date:  10/13/20 Room / Location:  [address] [place] [address]    Anesthesia Start:  5260 Anesthesia Stop:      Procedure:  COLONOSCOPY WITH POLYPECTOMY (N/A ) Diagnosis:  (Family history of colon cancer  Z80.0)    Surgeon:  Aquiles Kirk MD Responsible Provider:  Meera Briggs APRN - CRNA    Anesthesia Type:  MAC ASA Status:  3          Anesthesia Type: MAC    Suha Phase I: Suha Score: 10    Suha Phase II:      Last vitals: Reviewed and per EMR flowsheets.        Anesthesia Post Evaluation    Patient location during evaluation: bedside  Patient participation: complete - patient participated  Level of consciousness: awake and awake and alert  Pain score: 0  Airway patency: patent  Nausea & Vomiting: no nausea and no vomiting  Complications: no  Cardiovascular status: blood pressure returned to baseline and hemodynamically stable  Respiratory status: acceptable and spontaneous ventilation  Hydration status: euvolemic

## 2020-10-13 NOTE — H&P
Patient Name: Eduardo Cesar  : 1950  MRN: [id number]  DATE: 10/13/20      ENDOSCOPY  History and Physical    Procedure:    [] Diagnostic Colonoscopy       [x] Screening Colonoscopy  [] EGD      [] ERCP      [] EUS       [] Other    [x] Previous office notes/History and Physical reviewed from the patients chart. Please see EMR for further details of HPI. I have examined the patient's status immediately prior to the procedure and:      Indications/HPI:    []Abdominal Pain  []Cancer- GI/Lung  []Fhx of colon CA/polyps  []History of Polyps  []Stephenss   []Melena  []Abnormal Imaging  []Dysphagia    []Persistent Pneumonia  []Anemia  []Food Impaction  []History of Polyps  []GI Bleed  []Pulmonary nodule/Mass  []Change in bowel habits []Heartburn/Reflux  []Rectal Bleed (BRBPR)  []Chest Pain - Non Cardiac []Heme (+) Stoo  l[]Ulcers  []Constipation  []Hemoptysis   []Varices  []Diarrhea  []Hypoxemia  []Nausea/Vomiting  []Screening   []Crohns/Colitis  []Other:family h/o colon cancer. Anesthesia:   [x] MAC [] Moderate Sedation   [] General   [] None     ROS: 12 pt Review of Symptoms was negative unless mentioned above    Medications:   Prior to Admission medications    Medication Sig Start Date End Date Taking?  Authorizing Provider   atenolol (TENORMIN) 50 MG tablet TAKE 1 TABLET DAILY 19  Yes Nancy Mcdaniel MD   escitalopram (LEXAPRO) 20 MG tablet TAKE 1 TABLET DAILY 19  Yes Nancy Mcdaniel MD   levothyroxine (SYNTHROID) 75 MCG tablet TAKE 1 TABLET DAILY 19  Yes Nancy Mcdaniel MD   triamterene-hydrochlorothiazide [facility]) 37.5-25 MG per tablet TAKE 1 TABLET DAILY 19  Yes Nancy Mcdaniel MD   metFORMIN (GLUCOPHAGE) 500 MG tablet TAKE 1 TABLET DAILY WITH BREAKFAST 3/4/19   Nancy Mcdaniel MD   atorvastatin (LIPITOR) 80 MG tablet TAKE 1 TABLET DAILY 19   Nancy Mcdaniel MD   carbonyl iron (FEOSOL) 45 MG TABS Take by mouth See Admin Instructions One a day 3 days per week    Historical Provider, MD   Coenzyme Q10 (CO Q 10 PO) Take by mouth    Historical Provider, MD   Cholecalciferol (VITAMIN D) 2000 UNITS CAPS capsule Take by mouth    Historical Provider, MD   Multiple Vitamins-Minerals (MULTIVITAMIN PO) Take by mouth    Historical Provider, MD   aspirin 81 MG tablet Take 81 mg by mouth daily. Historical Provider, MD       Allergies: Allergies   Allergen Reactions    Neosporin Ophthalmic [Neomycin-Polymyx-Gramicid]     Seasonal         History of allergic reaction to anesthesia:  No    Past Medical History:  Past Medical History:   Diagnosis Date    Anxiety     Depression     Diabetes mellitus ([address]     prediabetic    Hyperlipidemia     Hypertension     Hypothyroidism     Obesity     Osteoarthritis     Palpitations     Renal insufficiency     Skin cancer        Past Surgical History:  Past Surgical History:   Procedure Laterality Date    COLONOSCOPY  11/16/11,5/2015    DR Fabby Pal nxt    ENDOMETRIAL BIOPSY      MOHS SURGERY  2011    TONSILLECTOMY AND ADENOIDECTOMY  1955       Social History:  Social History     Tobacco Use    Smoking status: Never Smoker    Smokeless tobacco: Never Used   Substance Use Topics    Alcohol use: Yes     Comment: occ.  Drug use: No       Vital Signs:   Vitals:    10/13/20 0657   BP: (!) 140/69   Pulse: 74   Resp: 18   Temp: 97.9 °F (36.6 °C)   SpO2: 97%        Physical Exam:  Cardiac:  [x]WNL  []Comments:  Pulmonary:  [x]WNL   []Comments:   Neuro/Mental Status:  [x]WNL  []Comments:  Abdominal:  [x]WNL    []Comments:  Other:   []WNL  []Comments:    Informed Consent:  The risks and benefits of the procedure have been discussed with either the patient or if they cannot consent, their representative. Assessment:  Patient examined and appropriate for planned sedation and procedure. Plan:  Proceed with planned sedation and procedure as above.     Sharon Rivera MD  7:23 AM

## 2021-09-25 LAB
AVERAGE GLUCOSE: NORMAL
HBA1C MFR BLD: 6.1 %

## 2022-03-04 ENCOUNTER — HOSPITAL ENCOUNTER (OUTPATIENT)
Dept: WOMENS IMAGING | Age: 72
Discharge: HOME | End: 2022-03-06
Payer: MEDICARE

## 2022-03-04 VITALS — BODY MASS INDEX: 42.57 KG/M2 | HEIGHT: 68 IN

## 2022-03-04 DIAGNOSIS — Z12.31 ENCOUNTER FOR SCREENING MAMMOGRAM FOR BREAST CANCER: ICD-10-CM

## 2022-03-04 PROCEDURE — 77063 BREAST TOMOSYNTHESIS BI: CPT

## 2023-02-28 LAB
ALBUMIN (G/DL) IN SER/PLAS: 4.3 G/DL (ref 3.4–5)
ANION GAP IN SER/PLAS: 13 MMOL/L (ref 10–20)
CALCIDIOL (25 OH VITAMIN D3) (NG/ML) IN SER/PLAS: 32 NG/ML
CALCIUM (MG/DL) IN SER/PLAS: 10 MG/DL (ref 8.6–10.3)
CARBON DIOXIDE, TOTAL (MMOL/L) IN SER/PLAS: 30 MMOL/L (ref 21–32)
CHLORIDE (MMOL/L) IN SER/PLAS: 98 MMOL/L (ref 98–107)
CREATININE (MG/DL) IN SER/PLAS: 1.09 MG/DL (ref 0.5–1.05)
GFR FEMALE: 54 ML/MIN/1.73M2
GLUCOSE (MG/DL) IN SER/PLAS: 80 MG/DL (ref 74–99)
PARATHYRIN INTACT (PG/ML) IN SER/PLAS: 25.1 PG/ML (ref 18.5–88)
PHOSPHATE (MG/DL) IN SER/PLAS: 2.6 MG/DL (ref 2.5–4.9)
POTASSIUM (MMOL/L) IN SER/PLAS: 4.1 MMOL/L (ref 3.5–5.3)
SODIUM (MMOL/L) IN SER/PLAS: 137 MMOL/L (ref 136–145)
UREA NITROGEN (MG/DL) IN SER/PLAS: 20 MG/DL (ref 6–23)

## 2023-03-01 LAB
ALBUMIN (MG/L) IN URINE: 18 MG/L
ALBUMIN/CREATININE (UG/MG) IN URINE: 10.7 UG/MG CRT (ref 0–30)
APPEARANCE, URINE: ABNORMAL
BACTERIA, URINE: ABNORMAL /HPF
BILIRUBIN, URINE: NEGATIVE
BLOOD, URINE: NEGATIVE
COLOR, URINE: YELLOW
CREATININE (MG/DL) IN URINE: 169 MG/DL (ref 20–320)
GLUCOSE, URINE: NEGATIVE MG/DL
HYALINE CASTS, URINE: ABNORMAL /LPF
KETONES, URINE: NEGATIVE MG/DL
LEUKOCYTE ESTERASE, URINE: ABNORMAL
MUCUS, URINE: ABNORMAL /LPF
NITRITE, URINE: NEGATIVE
PH, URINE: 5 (ref 5–8)
PROTEIN, URINE: NEGATIVE MG/DL
RBC, URINE: 4 /HPF (ref 0–5)
SPECIFIC GRAVITY, URINE: 1.02 (ref 1–1.03)
SQUAMOUS EPITHELIAL CELLS, URINE: 5 /HPF
UROBILINOGEN, URINE: <2 MG/DL (ref 0–1.9)
WBC, URINE: 55 /HPF (ref 0–5)

## 2023-04-13 ENCOUNTER — PATIENT MESSAGE (OUTPATIENT)
Dept: PRIMARY CARE | Facility: CLINIC | Age: 73
End: 2023-04-13
Payer: MEDICARE

## 2023-04-13 DIAGNOSIS — E78.2 MIXED HYPERLIPIDEMIA: ICD-10-CM

## 2023-04-13 NOTE — TELEPHONE ENCOUNTER
From: Emmy Chang  To: Fadumo Nixon MD  Sent: 4/13/2023 11:01 AM EDT  Subject: Prescription     Hello. I am in need of a new prescription for-  Atorvastin 80 mg. 90 day supply  Please submit to [facility]  Thank you.

## 2023-04-14 RX ORDER — ATORVASTATIN CALCIUM 80 MG/1
80 TABLET, FILM COATED ORAL DAILY
Qty: 90 TABLET | Refills: 1 | Status: SHIPPED | OUTPATIENT
Start: 2023-04-14 | End: 2023-10-11

## 2023-04-26 ENCOUNTER — LAB (OUTPATIENT)
Dept: LAB | Facility: LAB | Age: 73
End: 2023-04-26
Payer: MEDICARE

## 2023-04-26 DIAGNOSIS — E03.9 HYPOTHYROIDISM, UNSPECIFIED TYPE: ICD-10-CM

## 2023-04-26 DIAGNOSIS — I10 HYPERTENSION, UNSPECIFIED TYPE: ICD-10-CM

## 2023-04-26 DIAGNOSIS — E78.5 HYPERLIPIDEMIA, UNSPECIFIED HYPERLIPIDEMIA TYPE: ICD-10-CM

## 2023-04-26 DIAGNOSIS — R73.9 HYPERGLYCEMIA: ICD-10-CM

## 2023-04-26 LAB
ALANINE AMINOTRANSFERASE (SGPT) (U/L) IN SER/PLAS: 22 U/L (ref 7–45)
ALBUMIN (G/DL) IN SER/PLAS: 4.3 G/DL (ref 3.4–5)
ALKALINE PHOSPHATASE (U/L) IN SER/PLAS: 139 U/L (ref 33–136)
ANION GAP IN SER/PLAS: 16 MMOL/L (ref 10–20)
ASPARTATE AMINOTRANSFERASE (SGOT) (U/L) IN SER/PLAS: 17 U/L (ref 9–39)
BILIRUBIN TOTAL (MG/DL) IN SER/PLAS: 0.6 MG/DL (ref 0–1.2)
CALCIUM (MG/DL) IN SER/PLAS: 10 MG/DL (ref 8.6–10.3)
CARBON DIOXIDE, TOTAL (MMOL/L) IN SER/PLAS: 31 MMOL/L (ref 21–32)
CHLORIDE (MMOL/L) IN SER/PLAS: 100 MMOL/L (ref 98–107)
CHOLESTEROL (MG/DL) IN SER/PLAS: 183 MG/DL (ref 0–199)
CHOLESTEROL IN HDL (MG/DL) IN SER/PLAS: 41.7 MG/DL
CHOLESTEROL IN LDL (MG/DL) IN SER/PLAS BY DIRECT ASSAY: 79 MG/DL (ref 0–129)
CHOLESTEROL/HDL RATIO: 4.4
CREATININE (MG/DL) IN SER/PLAS: 1.18 MG/DL (ref 0.5–1.05)
ERYTHROCYTE DISTRIBUTION WIDTH (RATIO) BY AUTOMATED COUNT: 15.4 % (ref 11.5–14.5)
ERYTHROCYTE MEAN CORPUSCULAR HEMOGLOBIN CONCENTRATION (G/DL) BY AUTOMATED: 31.5 G/DL (ref 32–36)
ERYTHROCYTE MEAN CORPUSCULAR VOLUME (FL) BY AUTOMATED COUNT: 90 FL (ref 80–100)
ERYTHROCYTES (10*6/UL) IN BLOOD BY AUTOMATED COUNT: 4.13 X10E12/L (ref 4–5.2)
ESTIMATED AVERAGE GLUCOSE FOR HBA1C: 120 MG/DL
GFR FEMALE: 49 ML/MIN/1.73M2
GLUCOSE (MG/DL) IN SER/PLAS: 116 MG/DL (ref 74–99)
HEMATOCRIT (%) IN BLOOD BY AUTOMATED COUNT: 37.1 % (ref 36–46)
HEMOGLOBIN (G/DL) IN BLOOD: 11.7 G/DL (ref 12–16)
HEMOGLOBIN A1C/HEMOGLOBIN TOTAL IN BLOOD: 5.8 %
LDL: ABNORMAL MG/DL (ref 0–99)
LEUKOCYTES (10*3/UL) IN BLOOD BY AUTOMATED COUNT: 7.2 X10E9/L (ref 4.4–11.3)
NON HDL CHOLESTEROL: 141 MG/DL
PLATELETS (10*3/UL) IN BLOOD AUTOMATED COUNT: 332 X10E9/L (ref 150–450)
POTASSIUM (MMOL/L) IN SER/PLAS: 4.6 MMOL/L (ref 3.5–5.3)
PROTEIN TOTAL: 6.9 G/DL (ref 6.4–8.2)
SODIUM (MMOL/L) IN SER/PLAS: 142 MMOL/L (ref 136–145)
THYROTROPIN (MIU/L) IN SER/PLAS BY DETECTION LIMIT <= 0.05 MIU/L: 1.86 MIU/L (ref 0.44–3.98)
THYROXINE (T4) FREE (NG/DL) IN SER/PLAS: 0.92 NG/DL (ref 0.61–1.12)
TRIGLYCERIDE (MG/DL) IN SER/PLAS: 490 MG/DL (ref 0–149)
UREA NITROGEN (MG/DL) IN SER/PLAS: 19 MG/DL (ref 6–23)
VLDL: ABNORMAL MG/DL (ref 0–40)

## 2023-04-26 PROCEDURE — 80061 LIPID PANEL: CPT

## 2023-04-26 PROCEDURE — 84439 ASSAY OF FREE THYROXINE: CPT

## 2023-04-26 PROCEDURE — 80053 COMPREHEN METABOLIC PANEL: CPT

## 2023-04-26 PROCEDURE — 83036 HEMOGLOBIN GLYCOSYLATED A1C: CPT

## 2023-04-26 PROCEDURE — 36415 COLL VENOUS BLD VENIPUNCTURE: CPT

## 2023-04-26 PROCEDURE — 83721 ASSAY OF BLOOD LIPOPROTEIN: CPT

## 2023-04-26 PROCEDURE — 85027 COMPLETE CBC AUTOMATED: CPT

## 2023-04-26 PROCEDURE — 84443 ASSAY THYROID STIM HORMONE: CPT

## 2023-05-02 PROBLEM — F32.A ANXIETY AND DEPRESSION: Status: ACTIVE | Noted: 2023-05-02

## 2023-05-02 PROBLEM — E78.2 MIXED HYPERLIPIDEMIA: Status: ACTIVE | Noted: 2023-04-26

## 2023-05-02 PROBLEM — D12.4 ADENOMATOUS POLYP OF DESCENDING COLON: Status: ACTIVE | Noted: 2023-05-02

## 2023-05-02 PROBLEM — R73.03 PREDIABETES: Status: ACTIVE | Noted: 2023-05-02

## 2023-05-02 PROBLEM — Z85.828 HISTORY OF SKIN CANCER: Status: ACTIVE | Noted: 2023-05-02

## 2023-05-02 PROBLEM — D64.9 ANEMIA: Status: ACTIVE | Noted: 2023-05-02

## 2023-05-02 PROBLEM — R31.9 HEMATURIA: Status: ACTIVE | Noted: 2023-05-02

## 2023-05-02 PROBLEM — N85.02 ENDOMETRIAL HYPERPLASIA WITH ATYPIA: Status: ACTIVE | Noted: 2023-05-02

## 2023-05-02 PROBLEM — M48.061 SPINAL STENOSIS OF LUMBAR REGION: Status: ACTIVE | Noted: 2023-05-02

## 2023-05-02 PROBLEM — R73.9 HYPERGLYCEMIA: Status: ACTIVE | Noted: 2023-05-02

## 2023-05-02 PROBLEM — L40.9 SCALP PSORIASIS: Status: ACTIVE | Noted: 2023-05-02

## 2023-05-02 PROBLEM — N85.02 ENDOMETRIAL HYPERPLASIA WITH ATYPIA: Status: RESOLVED | Noted: 2023-05-02 | Resolved: 2023-05-02

## 2023-05-02 PROBLEM — M19.90 OSTEOARTHRITIS: Status: ACTIVE | Noted: 2023-05-02

## 2023-05-02 PROBLEM — F41.9 ANXIETY AND DEPRESSION: Status: ACTIVE | Noted: 2023-05-02

## 2023-05-02 PROBLEM — E03.8 OTHER SPECIFIED HYPOTHYROIDISM: Status: ACTIVE | Noted: 2023-04-26

## 2023-05-02 PROBLEM — I10 ESSENTIAL HYPERTENSION: Status: ACTIVE | Noted: 2023-04-26

## 2023-05-02 PROBLEM — G62.9 POLYNEUROPATHY: Status: ACTIVE | Noted: 2023-05-02

## 2023-05-03 ENCOUNTER — OFFICE VISIT (OUTPATIENT)
Dept: PRIMARY CARE | Facility: CLINIC | Age: 73
End: 2023-05-03
Payer: MEDICARE

## 2023-05-03 VITALS
OXYGEN SATURATION: 96 % | DIASTOLIC BLOOD PRESSURE: 74 MMHG | WEIGHT: 290 LBS | TEMPERATURE: 96.9 F | HEART RATE: 74 BPM | SYSTOLIC BLOOD PRESSURE: 122 MMHG | HEIGHT: 68 IN | RESPIRATION RATE: 18 BRPM | BODY MASS INDEX: 43.95 KG/M2

## 2023-05-03 DIAGNOSIS — Z00.00 ROUTINE GENERAL MEDICAL EXAMINATION AT HEALTH CARE FACILITY: ICD-10-CM

## 2023-05-03 DIAGNOSIS — M47.896 OTHER OSTEOARTHRITIS OF SPINE, LUMBAR REGION: ICD-10-CM

## 2023-05-03 DIAGNOSIS — M25.561 RECURRENT PAIN OF RIGHT KNEE: ICD-10-CM

## 2023-05-03 DIAGNOSIS — F32.A ANXIETY AND DEPRESSION: ICD-10-CM

## 2023-05-03 DIAGNOSIS — G62.9 POLYNEUROPATHY: ICD-10-CM

## 2023-05-03 DIAGNOSIS — R73.03 PREDIABETES: ICD-10-CM

## 2023-05-03 DIAGNOSIS — E78.2 MIXED HYPERLIPIDEMIA: ICD-10-CM

## 2023-05-03 DIAGNOSIS — Z00.00 ENCOUNTER FOR MEDICARE ANNUAL WELLNESS EXAM: Primary | ICD-10-CM

## 2023-05-03 DIAGNOSIS — I10 ESSENTIAL HYPERTENSION: ICD-10-CM

## 2023-05-03 DIAGNOSIS — D64.9 ANEMIA, UNSPECIFIED TYPE: ICD-10-CM

## 2023-05-03 DIAGNOSIS — M48.061 SPINAL STENOSIS OF LUMBAR REGION, UNSPECIFIED WHETHER NEUROGENIC CLAUDICATION PRESENT: ICD-10-CM

## 2023-05-03 DIAGNOSIS — N18.30 STAGE 3 CHRONIC KIDNEY DISEASE, UNSPECIFIED WHETHER STAGE 3A OR 3B CKD (MULTI): ICD-10-CM

## 2023-05-03 DIAGNOSIS — Z12.31 ENCOUNTER FOR SCREENING MAMMOGRAM FOR MALIGNANT NEOPLASM OF BREAST: ICD-10-CM

## 2023-05-03 DIAGNOSIS — F41.9 ANXIETY AND DEPRESSION: ICD-10-CM

## 2023-05-03 DIAGNOSIS — E03.8 OTHER SPECIFIED HYPOTHYROIDISM: ICD-10-CM

## 2023-05-03 DIAGNOSIS — R73.9 HYPERGLYCEMIA: ICD-10-CM

## 2023-05-03 PROCEDURE — G0439 PPPS, SUBSEQ VISIT: HCPCS | Performed by: FAMILY MEDICINE

## 2023-05-03 PROCEDURE — 3074F SYST BP LT 130 MM HG: CPT | Performed by: FAMILY MEDICINE

## 2023-05-03 PROCEDURE — 1170F FXNL STATUS ASSESSED: CPT | Performed by: FAMILY MEDICINE

## 2023-05-03 PROCEDURE — 99213 OFFICE O/P EST LOW 20 MIN: CPT | Performed by: FAMILY MEDICINE

## 2023-05-03 PROCEDURE — 1036F TOBACCO NON-USER: CPT | Performed by: FAMILY MEDICINE

## 2023-05-03 PROCEDURE — 3078F DIAST BP <80 MM HG: CPT | Performed by: FAMILY MEDICINE

## 2023-05-03 PROCEDURE — 1159F MED LIST DOCD IN RCRD: CPT | Performed by: FAMILY MEDICINE

## 2023-05-03 PROCEDURE — 1160F RVW MEDS BY RX/DR IN RCRD: CPT | Performed by: FAMILY MEDICINE

## 2023-05-03 RX ORDER — TRIAMTERENE/HYDROCHLOROTHIAZID 37.5-25 MG
1 TABLET ORAL DAILY
COMMUNITY
Start: 2014-08-14 | End: 2023-12-18

## 2023-05-03 RX ORDER — PHENOL 1.4 %
AEROSOL, SPRAY (ML) MUCOUS MEMBRANE
COMMUNITY
Start: 2019-05-29

## 2023-05-03 RX ORDER — METFORMIN HYDROCHLORIDE 500 MG/1
500 TABLET ORAL
COMMUNITY
Start: 2019-03-04 | End: 2024-04-22 | Stop reason: SDUPTHER

## 2023-05-03 RX ORDER — ESCITALOPRAM OXALATE 20 MG/1
20 TABLET ORAL DAILY
COMMUNITY
Start: 2014-05-20 | End: 2023-08-18

## 2023-05-03 RX ORDER — LEVOTHYROXINE SODIUM 88 UG/1
88 TABLET ORAL
COMMUNITY
Start: 2021-10-01 | End: 2023-05-23

## 2023-05-03 RX ORDER — GABAPENTIN 100 MG/1
1 CAPSULE ORAL 3 TIMES DAILY
COMMUNITY
Start: 2023-04-19 | End: 2024-01-31 | Stop reason: SDUPTHER

## 2023-05-03 RX ORDER — ASPIRIN 81 MG/1
1 TABLET ORAL DAILY
COMMUNITY
Start: 2019-05-29

## 2023-05-03 RX ORDER — EPINEPHRINE 0.22MG
100 AEROSOL WITH ADAPTER (ML) INHALATION DAILY
COMMUNITY
Start: 2019-05-29

## 2023-05-03 RX ORDER — DULAGLUTIDE 1.5 MG/.5ML
1.5 INJECTION, SOLUTION SUBCUTANEOUS
COMMUNITY
Start: 2022-08-09 | End: 2023-05-19 | Stop reason: ALTCHOICE

## 2023-05-03 RX ORDER — ATENOLOL 50 MG/1
50 TABLET ORAL DAILY
COMMUNITY
Start: 2011-07-23 | End: 2023-07-13

## 2023-05-03 ASSESSMENT — ACTIVITIES OF DAILY LIVING (ADL)
GROCERY_SHOPPING: INDEPENDENT
DOING_HOUSEWORK: INDEPENDENT
TAKING_MEDICATION: INDEPENDENT
DRESSING: INDEPENDENT
BATHING: INDEPENDENT
MANAGING_FINANCES: INDEPENDENT

## 2023-05-03 ASSESSMENT — PATIENT HEALTH QUESTIONNAIRE - PHQ9
SUM OF ALL RESPONSES TO PHQ9 QUESTIONS 1 AND 2: 0
2. FEELING DOWN, DEPRESSED OR HOPELESS: NOT AT ALL
1. LITTLE INTEREST OR PLEASURE IN DOING THINGS: NOT AT ALL

## 2023-05-03 ASSESSMENT — ENCOUNTER SYMPTOMS
OCCASIONAL FEELINGS OF UNSTEADINESS: 0
DEPRESSION: 0
LOSS OF SENSATION IN FEET: 0

## 2023-05-03 NOTE — PROGRESS NOTES
Subjective   Reason for Visit: Emmy Chang is a 73 y.o. female here for a Medicare Wellness visit.     CHECKLIST REVIEWED AND COMPLETE FOR AMW    Past Medical, Surgical, and Family History reviewed and updated in chart.    Reviewed all medications by prescribing practitioner or clinical pharmacist (such as prescriptions, OTCs, herbal therapies and supplements) and documented in the medical record.  Medicare Annual Wellness Visit Subsequent, Prediabetes (Follow up with Trulicity), Hypertension, Hypothyroidism, Depression, and Knee Pain (Right-no known injury)  HPI    Patient Self Assessment of Health Status  Patient Self Assessment: Good    Nutrition and Exercise  Current Diet: Diabetic Diet  Adequate Fluid Intake: Yes  Caffeine: Yes  Exercise Frequency: Regularly    Functional Ability/Level of Safety  Cognitive Impairment Observed: No cognitive impairment observed    Home Safety Risk Factors: None    Patient Care Team:  Fadumo Nixon MD as PCP - General    HPI  Patient Active Problem List   Diagnosis    Adenomatous polyp of descending colon    Anemia    Anxiety and depression    Osteoarthritis    CKD (chronic kidney disease) stage 3, GFR 30-59 ml/min (CMS/[facility])    Degenerative cervical disc    Essential hypertension    Hematuria    History of skin cancer    Hyperglycemia    Mixed hyperlipidemia    Other specified hypothyroidism    Polyneuropathy    Prediabetes    Scalp psoriasis    Spinal stenosis of lumbar region      Past Surgical History:   Procedure Laterality Date    COLONOSCOPY W/ POLYPECTOMY  10/2020    HYSTERECTOMY  01/2022    SKIN SURGERY  2016    MOHS x 5-last 2016    TONSILLECTOMY  1955       Review of Systems  This patient has   NO history of recent Covid nor flu symptoms,  NO Fever nor chills,  NO Chest pain, shortness of breath nor paroxysmal nocturnal dyspnea,  NO Nausea, vomiting, nor diarrhea,  NO Hematochezia nor melena,  NO Dysuria, hematuria, nor new incontinence issues  NO new severe  "headaches nor neurological complaints,  NO new issues with anxiety nor depression nor new psychiatric complaints,  NO suicidal nor homicidal ideations.     OBJECTIVE:  /74   Pulse 74   Temp 36.1 °C (96.9 °F) (Temporal)   Resp 18   Ht 1.727 m (5' 8\")   Wt 132 kg (290 lb)   LMP  (LMP Unknown)   SpO2 96%   BMI 44.09 kg/m²      General:  alert, oriented, no acute distress.  No obvious skin rashes noted.   No gait disturbance noted.    Mood is pleasant, not tearful, no signs of emotional distress.  Not appearing intoxicated or altered.   No voiced delusions,   Normal, appropriate behavior.    HEENT: Normocephalic, atraumatic,   Pupils round, reactive to light  Extraocular motions intact and wnl  Tympanic membranes normal    Neck: no nuchal rigidity  No masses palpable.  No carotid bruits.  No thyromegaly.    Respiratory: Equal breath sounds  No wheezes,    rales,    nor rhonchi  No respiratory distress.    Heart: Regular rate and rhythm, no    murmurs  no rubs/gallops    Abdomen: no masses palpable, no rebound nor guarding, no rebound nor guarding overwt.    Extremities: NO cyanosis noted, no clubbing.   No edema noted.  2+dorsalis pedis pulses.    Normal-not antalgic, steady gait.    Lab on 04/26/2023   Component Date Value Ref Range Status    Free T4 04/26/2023 0.92  0.61 - 1.12 ng/dL Final     Thyroxine Free testing is performed using different testing    methodology at [facility] than at other    [facility]. Direct result comparisons should    only be made within the same method.  .   Biotin can cause falsely elevated free T4 results. Patients   taking a Biotin dose of up to 10 mg/day should refrain from   taking Biotin for 24 hours before sample collection. Patient   taking a Biotin dose of >10 mg/day should consult with their   physician or the laboratory before the blood draw.    TSH 04/26/2023 1.86  0.44 - 3.98 mIU/L Final     TSH testing is performed using different testing    " methodology at [facility] than at other    [facility]. Direct result comparisons should    only be made within the same method.    Hemoglobin A1C 04/26/2023 5.8 (A)  % Final         Diagnosis of Diabetes-Adults   Non-Diabetic: < or = 5.6%   Increased risk for developing diabetes: 5.7-6.4%   Diagnostic of diabetes: > or = 6.5%  .       Monitoring of Diabetes                Age (y)     Therapeutic Goal (%)   Adults:          >18           <7.0   Pediatrics:    13-18           <7.5                   7-12           <8.0                   0- 6            7.5-8.5   American Diabetes Association. Diabetes Care 33(S1), Jan 2010.    Estimated Average Glucose 04/26/2023 120  MG/DL Final    WBC 04/26/2023 7.2  4.4 - 11.3 x10E9/L Final    RBC 04/26/2023 4.13  4.00 - 5.20 x10E12/L Final    Hemoglobin 04/26/2023 11.7 (L)  12.0 - 16.0 g/dL Final    Hematocrit 04/26/2023 37.1  36.0 - 46.0 % Final    MCV 04/26/2023 90  80 - 100 fL Final    MCHC 04/26/2023 31.5 (L)  32.0 - 36.0 g/dL Final    Platelets 04/26/2023 332  150 - 450 x10E9/L Final    RDW 04/26/2023 15.4 (H)  11.5 - 14.5 % Final    Cholesterol 04/26/2023 183  0 - 199 mg/dL Final    .      AGE      DESIRABLE   BORDERLINE HIGH   HIGH     0-19 Y     0 - 169       170 - 199     >/= 200    20-24 Y     0 - 189       190 - 224     >/= 225         >24 Y     0 - 199       200 - 239     >/= 240   **All ranges are based on fasting samples. Specific   therapeutic targets will vary based on patient-specific   cardiac risk.  .   Pediatric guidelines reference:Pediatrics 2011, 128(S5).   Adult guidelines reference: NCEP ATPIII Guidelines,     TL 2001, 258:2486-97  .   Venipuncture immediately after or during the    administration of Metamizole may lead to falsely   low results. Testing should be performed immediately   prior to Metamizole dosing.    HDL 04/26/2023 41.7  mg/dL Final    .      AGE      VERY LOW   LOW     NORMAL    HIGH       0-19 Y       < 35   < 40      40-45     ----    20-24 Y       ----   < 40       >45     ----      >24 Y       ----   < 40     40-60      >60  .    Cholesterol/HDL Ratio 04/26/2023 4.4   Final    REF VALUES  DESIRABLE  < 3.4  HIGH RISK  > 5.0    LDL 04/26/2023 -  0 - 99 mg/dL Final    .                           NEAR      BORD      AGE      DESIRABLE  OPTIMAL    HIGH     HIGH     VERY HIGH     0-19 Y     0 - 109     ---    110-129   >/= 130     ----    20-24 Y     0 - 119     ---    120-159   >/= 160     ----      >24 Y     0 -  99   100-129  130-159   160-189     >/=190  .  THE CALCULATION OF LDL AND VLDL ARE INACCURATE  WHEN TRIGLYCERIDES ARE GREATER THAN 400 MG/DL  OR WHEN THE PATIENT IS NON-FASTING. IF LDL  MEASUREMENT IS NECESSARY CONTACT THE TESTING  LABORATORY FOR AN ALTERNATIVE LDL ASSAY.    VLDL 04/26/2023 SEE COMMENT  0 - 40 mg/dL Final      Unable to calculate VLDL.    Triglycerides 04/26/2023 490 (H)  0 - 149 mg/dL Final    .      AGE      DESIRABLE   BORDERLINE HIGH   HIGH     VERY HIGH   0 D-90 D    19 - 174         ----         ----        ----  91 D- 9 Y     0 -  74        75 -  99     >/= 100      ----    10-19 Y     0 -  89        90 - 129     >/= 130      ----    20-24 Y     0 - 114       115 - 149     >/= 150      ----         >24 Y     0 - 149       150 - 199    200- 499    >/= 500  .   Venipuncture immediately after or during the    administration of Metamizole may lead to falsely   low results. Testing should be performed immediately   prior to Metamizole dosing.    Non HDL Cholesterol 04/26/2023 141  mg/dL Final        AGE      DESIRABLE   BORDERLINE HIGH   HIGH     VERY HIGH     0-19 Y     0 - 119       120 - 144     >/= 145    >/= 160    20-24 Y     0 - 149       150 - 189     >/= 190      ----         >24 Y    30 MG/DL ABOVE LDL CHOLESTEROL GOAL  .    Glucose 04/26/2023 116 (H)  74 - 99 mg/dL Final    Sodium 04/26/2023 142  136 - 145 mmol/L Final    Potassium 04/26/2023 4.6  3.5 - 5.3 mmol/L Final    Chloride 04/26/2023  100  98 - 107 mmol/L Final    Bicarbonate 04/26/2023 31  21 - 32 mmol/L Final    Anion Gap 04/26/2023 16  10 - 20 mmol/L Final    Urea Nitrogen 04/26/2023 19  6 - 23 mg/dL Final    Creatinine 04/26/2023 1.18 (H)  0.50 - 1.05 mg/dL Final    GFR Female 04/26/2023 49 (A)  >90 mL/min/1.73m2 Final     CALCULATIONS OF ESTIMATED GFR ARE PERFORMED   USING THE 2021 CKD-EPI STUDY REFIT EQUATION   WITHOUT THE RACE VARIABLE FOR THE IDMS-TRACEABLE   CREATININE METHODS.    https://jasn.asnjournals.org/content/early/2021/09/22/ASN.5440356208    Calcium 04/26/2023 10.0  8.6 - 10.3 mg/dL Final    Albumin 04/26/2023 4.3  3.4 - 5.0 g/dL Final    Alkaline Phosphatase 04/26/2023 139 (H)  33 - 136 U/L Final    Total Protein 04/26/2023 6.9  6.4 - 8.2 g/dL Final    AST 04/26/2023 17  9 - 39 U/L Final    Total Bilirubin 04/26/2023 0.6  0.0 - 1.2 mg/dL Final    ALT (SGPT) 04/26/2023 22  7 - 45 U/L Final     Patients treated with Sulfasalazine may generate    falsely decreased results for ALT.    LDL Direct 04/26/2023 79  0 - 129 mg/dL Final    Elevated levels of LDL cholesterol are recognized as a key   factor in the development of atherosclerosis and CHD. The   direct LDL cholesterol test can be used to assess   cardiovascular risk and monitor therapy as a follow up to   a lipid profile when triglycerides are significantly elevated.   Orders Only on 03/01/2023   Component Date Value Ref Range Status    WBC, Urine 03/01/2023 55 (A)  0 - 5 /HPF Final    RBC, Urine 03/01/2023 4  0 - 5 /HPF Final    Squamous Epithelial Cells, Urine 03/01/2023 5  /HPF Final    Bacteria, Urine 03/01/2023 1+ (A)  /HPF Final    Mucus, Urine 03/01/2023 1+  /LPF Final    Hyaline Casts, Urine 03/01/2023 OCC (A)  /LPF Final   Orders Only on 03/01/2023   Component Date Value Ref Range Status    Color, Urine 03/01/2023 YELLOW  STRAW,YELLOW Final    Appearance, Urine 03/01/2023 HAZY  CLEAR Final    Specific Gravity, Urine 03/01/2023 1.019  1.005 - 1.035 Final    pH, Urine  03/01/2023 5.0  5.0 - 8.0 Final    Protein, Urine 03/01/2023 NEGATIVE  NEGATIVE mg/dL Final    Glucose, Urine 03/01/2023 NEGATIVE  NEGATIVE mg/dL Final    Blood, Urine 03/01/2023 NEGATIVE  NEGATIVE Final    Ketones, Urine 03/01/2023 NEGATIVE  NEGATIVE mg/dL Final    Bilirubin, Urine 03/01/2023 NEGATIVE  NEGATIVE Final    Urobilinogen, Urine 03/01/2023 <2.0  0.0 - 1.9 mg/dL Final    Nitrite, Urine 03/01/2023 NEGATIVE  NEGATIVE Final    Leukocyte Esterase, Urine 03/01/2023 LARGE (3+) (A)  NEGATIVE Final   Orders Only on 03/01/2023   Component Date Value Ref Range Status    ALBUMIN (MG/L) IN URINE 03/01/2023 18.0  Not Established mg/L Final    Albumin/Creatine Ratio 03/01/2023 10.7  0.0 - 30.0 ug/mg crt Final    Creatinine, Urine 03/01/2023 169.0  20.0 - 320.0 mg/dL Final   Orders Only on 02/28/2023   Component Date Value Ref Range Status    Vitamin D, 25-Hydroxy 02/28/2023 32  ng/mL Final    Comment: .  DEFICIENCY:         < 20   NG/ML  INSUFFICIENCY:      20-29  NG/ML  SUFFICIENCY:         NG/ML    THIS ASSAY ACCURATELY QUANTIFIES THE SUM OF  VITAMIN D3, 25-HYDROXY AND VIT D2,25-HYDROXY.     Orders Only on 02/28/2023   Component Date Value Ref Range Status    Glucose 02/28/2023 80  74 - 99 mg/dL Final    Sodium 02/28/2023 137  136 - 145 mmol/L Final    Potassium 02/28/2023 4.1  3.5 - 5.3 mmol/L Final    Chloride 02/28/2023 98  98 - 107 mmol/L Final    Bicarbonate 02/28/2023 30  21 - 32 mmol/L Final    Anion Gap 02/28/2023 13  10 - 20 mmol/L Final    Urea Nitrogen 02/28/2023 20  6 - 23 mg/dL Final    Creatinine 02/28/2023 1.09 (H)  0.50 - 1.05 mg/dL Final    GFR Female 02/28/2023 54 (A)  >90 mL/min/1.73m2 Final    Comment:  CALCULATIONS OF ESTIMATED GFR ARE PERFORMED   USING THE 2021 CKD-EPI STUDY REFIT EQUATION   WITHOUT THE RACE VARIABLE FOR THE IDMS-TRACEABLE   CREATININE METHODS.    https://jasn.asnjournals.org/content/early/2021/09/22/ASN.8207658065      Calcium 02/28/2023 10.0  8.6 - 10.3 mg/dL Final     Phosphorus 02/28/2023 2.6  2.5 - 4.9 mg/dL Final    Comment:  The performance characteristics of phosphorus testing in   heparinized plasma have been validated by the individual     laboratory site where testing is performed. Testing    on heparinized plasma is not approved by the FDA;    however, such approval is not necessary.      Albumin 02/28/2023 4.3  3.4 - 5.0 g/dL Final   Orders Only on 02/28/2023   Component Date Value Ref Range Status    PTH 02/28/2023 25.1  18.5 - 88.0 pg/mL Final        Assessment/Plan     Problem List Items Addressed This Visit          Nervous    Polyneuropathy    Spinal stenosis of lumbar region       Circulatory    Essential hypertension       Genitourinary    CKD (chronic kidney disease) stage 3, GFR 30-59 ml/min (CMS/[facility])       Endocrine/Metabolic    Other specified hypothyroidism    Prediabetes       Hematologic    Anemia       Other    Anxiety and depression    Hyperglycemia    Relevant Medications    semaglutide 0.25 mg or 0.5 mg (2 mg/3 mL) pen injector    Mixed hyperlipidemia     Other Visit Diagnoses       Encounter for Medicare annual wellness exam    -  Primary    Encounter for screening mammogram for malignant neoplasm of breast        Relevant Orders    BI mammo bilateral screening tomosynthesis    Routine general medical examination at health care facility              Advance Care Planning Note   Discussion Date: 05/03/23   Discussion Participants: patient  Full code desired  No depression  No dementia  No major falls w injury  Ldl 79    Hba1c 5.8    The patient wishes to discuss Advance Care Planning today and the following is a brief summary of our discussion.     Patient has capacity to make their own medical decisions: Yes  Health Care Agent/Surrogate Decision Maker documented in chart: Yes  Documents on file and valid:  Advance Directive/Living Will: no   Health Care Power of : no  Communication of Medical Status/Prognosis:   yes   Communication of  Treatment Goals/Options:   yes  Treatment Decisions  yes  Time Statement: Total face to face time spent on advance care planning was <30 minutes with <30 minutes spent in counseling, including the explanation.    SEE ME AT NEXT REGULARLY SCHEDULED VISIT-sooner if condition deteriorates or new problems arise.  Low carb diet  See me 4mo hba1c cmp cbc    Sees nephro  Declines gi workup for anemia  Some r knee pain-djd likely needs xr

## 2023-05-05 ENCOUNTER — TELEMEDICINE (OUTPATIENT)
Dept: PHARMACY | Facility: HOSPITAL | Age: 73
End: 2023-05-05
Payer: MEDICARE

## 2023-05-05 DIAGNOSIS — R73.03 PREDIABETES: ICD-10-CM

## 2023-05-05 DIAGNOSIS — R73.03 PREDIABETES: Primary | ICD-10-CM

## 2023-05-05 RX ORDER — SEMAGLUTIDE 0.68 MG/ML
0.5 INJECTION, SOLUTION SUBCUTANEOUS
Qty: 3 ML | Refills: 3 | Status: SHIPPED | OUTPATIENT
Start: 2023-05-05 | End: 2023-05-19 | Stop reason: DRUGHIGH

## 2023-05-05 NOTE — PROGRESS NOTES
Patient initiated on Ozempic. Patient typically uses [facility] but due to them wanting a 90 day supply, initial dose was sent to [facility] for initiation.    Referring Provider: MD Eliza Gunn, PharmD   PGY1 Pharmacy Resident  Phone: [phone], [facility].      Verbal consent to manage patient’s drug therapy was obtained from the patient. They were informed they may decline to participate or withdraw from participation in pharmacy services at any time.

## 2023-05-05 NOTE — PROGRESS NOTES
I reviewed the progress note and agree with the resident’s findings and plans as written. Case discussed with resident.    Hermelinda Thacker, RobbD

## 2023-05-19 ENCOUNTER — PATIENT MESSAGE (OUTPATIENT)
Dept: PRIMARY CARE | Facility: CLINIC | Age: 73
End: 2023-05-19
Payer: MEDICARE

## 2023-05-19 DIAGNOSIS — R73.03 PREDIABETES: ICD-10-CM

## 2023-05-19 NOTE — TELEPHONE ENCOUNTER
From: Emmy Chang  To: Fadumo Nixon MD  Sent: 5/19/2023 7:54 AM EDT  Subject: Ozempic dosage    Good morning. At my last visit on May 3rd we switched from Trulicity to a low dose of Ozempic. I I believe the plan was for me to let you know after a few weeks if I wanted to increase the dose. I would like to do that. If this is correct please submit the new prescription to my mail order pharmacy, [facility]. They will cover a 90 day supply. Thank you  Emmy

## 2023-05-23 DIAGNOSIS — E03.8 OTHER SPECIFIED HYPOTHYROIDISM: ICD-10-CM

## 2023-05-23 RX ORDER — LEVOTHYROXINE SODIUM 88 UG/1
TABLET ORAL
Qty: 90 TABLET | Refills: 3 | Status: SHIPPED | OUTPATIENT
Start: 2023-05-23 | End: 2024-05-17

## 2023-07-13 DIAGNOSIS — I10 ESSENTIAL HYPERTENSION: ICD-10-CM

## 2023-07-13 RX ORDER — ATENOLOL 50 MG/1
TABLET ORAL
Qty: 90 TABLET | Refills: 3 | Status: SHIPPED | OUTPATIENT
Start: 2023-07-13

## 2023-08-09 ENCOUNTER — LAB (OUTPATIENT)
Dept: LAB | Facility: LAB | Age: 73
End: 2023-08-09
Payer: MEDICARE

## 2023-08-09 DIAGNOSIS — R73.03 PREDIABETES: ICD-10-CM

## 2023-08-09 DIAGNOSIS — I10 ESSENTIAL HYPERTENSION: ICD-10-CM

## 2023-08-09 DIAGNOSIS — D64.9 ANEMIA, UNSPECIFIED TYPE: ICD-10-CM

## 2023-08-09 LAB
ALANINE AMINOTRANSFERASE (SGPT) (U/L) IN SER/PLAS: 23 U/L (ref 7–45)
ALBUMIN (G/DL) IN SER/PLAS: 4.2 G/DL (ref 3.4–5)
ALKALINE PHOSPHATASE (U/L) IN SER/PLAS: 141 U/L (ref 33–136)
ANION GAP IN SER/PLAS: 14 MMOL/L (ref 10–20)
ASPARTATE AMINOTRANSFERASE (SGOT) (U/L) IN SER/PLAS: 18 U/L (ref 9–39)
BILIRUBIN TOTAL (MG/DL) IN SER/PLAS: 0.5 MG/DL (ref 0–1.2)
CALCIUM (MG/DL) IN SER/PLAS: 9.7 MG/DL (ref 8.6–10.3)
CARBON DIOXIDE, TOTAL (MMOL/L) IN SER/PLAS: 29 MMOL/L (ref 21–32)
CHLORIDE (MMOL/L) IN SER/PLAS: 102 MMOL/L (ref 98–107)
CREATININE (MG/DL) IN SER/PLAS: 1.06 MG/DL (ref 0.5–1.05)
ERYTHROCYTE DISTRIBUTION WIDTH (RATIO) BY AUTOMATED COUNT: 16 % (ref 11.5–14.5)
ERYTHROCYTE MEAN CORPUSCULAR HEMOGLOBIN CONCENTRATION (G/DL) BY AUTOMATED: 31.5 G/DL (ref 32–36)
ERYTHROCYTE MEAN CORPUSCULAR VOLUME (FL) BY AUTOMATED COUNT: 90 FL (ref 80–100)
ERYTHROCYTES (10*6/UL) IN BLOOD BY AUTOMATED COUNT: 4.1 X10E12/L (ref 4–5.2)
ESTIMATED AVERAGE GLUCOSE FOR HBA1C: 123 MG/DL
GFR FEMALE: 56 ML/MIN/1.73M2
GLUCOSE (MG/DL) IN SER/PLAS: 113 MG/DL (ref 74–99)
HEMATOCRIT (%) IN BLOOD BY AUTOMATED COUNT: 37.1 % (ref 36–46)
HEMOGLOBIN (G/DL) IN BLOOD: 11.7 G/DL (ref 12–16)
HEMOGLOBIN A1C/HEMOGLOBIN TOTAL IN BLOOD: 5.9 %
LEUKOCYTES (10*3/UL) IN BLOOD BY AUTOMATED COUNT: 8.1 X10E9/L (ref 4.4–11.3)
PLATELETS (10*3/UL) IN BLOOD AUTOMATED COUNT: 344 X10E9/L (ref 150–450)
POTASSIUM (MMOL/L) IN SER/PLAS: 4.8 MMOL/L (ref 3.5–5.3)
PROTEIN TOTAL: 6.8 G/DL (ref 6.4–8.2)
SODIUM (MMOL/L) IN SER/PLAS: 140 MMOL/L (ref 136–145)
UREA NITROGEN (MG/DL) IN SER/PLAS: 20 MG/DL (ref 6–23)

## 2023-08-09 PROCEDURE — 36415 COLL VENOUS BLD VENIPUNCTURE: CPT

## 2023-08-09 PROCEDURE — 83036 HEMOGLOBIN GLYCOSYLATED A1C: CPT

## 2023-08-09 PROCEDURE — 80053 COMPREHEN METABOLIC PANEL: CPT

## 2023-08-09 PROCEDURE — 85027 COMPLETE CBC AUTOMATED: CPT

## 2023-08-14 ENCOUNTER — OFFICE VISIT (OUTPATIENT)
Dept: PRIMARY CARE | Facility: CLINIC | Age: 73
End: 2023-08-14
Payer: MEDICARE

## 2023-08-14 VITALS
BODY MASS INDEX: 43.65 KG/M2 | TEMPERATURE: 96.2 F | RESPIRATION RATE: 16 BRPM | DIASTOLIC BLOOD PRESSURE: 60 MMHG | SYSTOLIC BLOOD PRESSURE: 118 MMHG | HEART RATE: 90 BPM | HEIGHT: 68 IN | WEIGHT: 288 LBS | OXYGEN SATURATION: 97 %

## 2023-08-14 DIAGNOSIS — C54.1 MALIGNANT NEOPLASM OF ENDOMETRIUM (MULTI): Primary | ICD-10-CM

## 2023-08-14 DIAGNOSIS — F41.9 ANXIETY AND DEPRESSION: ICD-10-CM

## 2023-08-14 DIAGNOSIS — R73.9 HYPERGLYCEMIA: ICD-10-CM

## 2023-08-14 DIAGNOSIS — E78.2 MIXED HYPERLIPIDEMIA: ICD-10-CM

## 2023-08-14 DIAGNOSIS — M54.16 LUMBAR RADICULOPATHY: ICD-10-CM

## 2023-08-14 DIAGNOSIS — M79.671 PAIN IN BOTH FEET: ICD-10-CM

## 2023-08-14 DIAGNOSIS — F32.A ANXIETY AND DEPRESSION: ICD-10-CM

## 2023-08-14 DIAGNOSIS — M79.672 PAIN IN BOTH FEET: ICD-10-CM

## 2023-08-14 DIAGNOSIS — E03.8 OTHER SPECIFIED HYPOTHYROIDISM: ICD-10-CM

## 2023-08-14 DIAGNOSIS — I10 ESSENTIAL HYPERTENSION: ICD-10-CM

## 2023-08-14 PROCEDURE — 3074F SYST BP LT 130 MM HG: CPT | Performed by: FAMILY MEDICINE

## 2023-08-14 PROCEDURE — 1160F RVW MEDS BY RX/DR IN RCRD: CPT | Performed by: FAMILY MEDICINE

## 2023-08-14 PROCEDURE — 1036F TOBACCO NON-USER: CPT | Performed by: FAMILY MEDICINE

## 2023-08-14 PROCEDURE — 3008F BODY MASS INDEX DOCD: CPT | Performed by: FAMILY MEDICINE

## 2023-08-14 PROCEDURE — 3078F DIAST BP <80 MM HG: CPT | Performed by: FAMILY MEDICINE

## 2023-08-14 PROCEDURE — 99214 OFFICE O/P EST MOD 30 MIN: CPT | Performed by: FAMILY MEDICINE

## 2023-08-14 PROCEDURE — 1159F MED LIST DOCD IN RCRD: CPT | Performed by: FAMILY MEDICINE

## 2023-08-14 NOTE — PROGRESS NOTES
"Subjective   Patient ID: Emmy Chang is a 73 y.o. female who presents for Follow-up (3month -  no refills needed at this time/Sees pain mangt), Results (Discuss blood work), Med Management (Taking ozempic), Hypertension, Hyperlipidemia, and Hypothyroidism.  C19: [id number]  Pap: Hysterectomy  Mamm: UTD  ColoRect: UTD   hba1c 5.9  On ozempic-off label-pt aware and aware of rba  Mild anemia-d/w pt  Mild renal ins-d/w pt  Ldl 79  Mood is good-stable  Pain mgmt for back and feet  B12 not much help    HPI  Patient Active Problem List   Diagnosis    Adenomatous polyp of descending colon    Anemia    Anxiety and depression    Osteoarthritis    CKD (chronic kidney disease) stage 3, GFR 30-59 ml/min (CMS/HCC)    Degenerative cervical disc    Essential hypertension    Hematuria    History of skin cancer    Hyperglycemia    Mixed hyperlipidemia    Other specified hypothyroidism    Polyneuropathy    Prediabetes    Scalp psoriasis    Spinal stenosis of lumbar region    Malignant neoplasm of endometrium (CMS/HCC)       Past Surgical History:   Procedure Laterality Date    COLONOSCOPY W/ POLYPECTOMY  10/2020    mucosal fold    HYSTERECTOMY  01/2022    SKIN SURGERY  2016    MOHS x 5-last 2016    TONSILLECTOMY  1955       Review of Systems nosz mi cad or cva    This patient has   NO history of recent Covid nor flu symptoms,  NO Fever nor chills,  NO Chest pain, shortness of breath nor paroxysmal nocturnal dyspnea,  NO Nausea, vomiting, nor diarrhea,  NO Hematochezia nor melena,  NO Dysuria, hematuria, nor new incontinence issues  NO new severe headaches nor neurological complaints,  NO new issues with anxiety nor depression nor new psychiatric complaints,  NO suicidal nor homicidal ideations.     OBJECTIVE:  /60 (BP Location: Right arm, Patient Position: Sitting, BP Cuff Size: Large adult)   Pulse 90   Temp 35.7 °C (96.2 °F) (Temporal)   Resp 16   Ht 1.727 m (5' 8\")   Wt 131 kg (288 lb)   LMP  (LMP Unknown)   SpO2 97%  "  BMI 43.79 kg/m²      General:  alert, oriented, no acute distress.  No obvious skin rashes noted.   No gait disturbance noted.    Mood is pleasant, not tearful, no signs of emotional distress.  Not appearing intoxicated or altered.   No voiced delusions,   Normal, appropriate behavior.    HEENT: Normocephalic, atraumatic,   Pupils round, reactive to light  Extraocular motions intact and wnl  Tympanic membranes normal    Neck: no nuchal rigidity  No masses palpable.  No carotid bruits.  No thyromegaly.    Respiratory: Equal breath sounds  No wheezes,    rales,    nor rhonchi  No respiratory distress.    Heart: Regular rate and rhythm, no    murmurs  no rubs/gallops    Abdomen: no masses palpable, nontender, no rebound nor guarding overwt.    Extremities: NO cyanosis noted, no clubbing.   No edema noted.  2+dorsalis pedis pulses.    Normal-not antalgic, steady gait.    Lab on 08/09/2023   Component Date Value Ref Range Status    Glucose 08/09/2023 113 (H)  74 - 99 mg/dL Final    Sodium 08/09/2023 140  136 - 145 mmol/L Final    Potassium 08/09/2023 4.8  3.5 - 5.3 mmol/L Final    Chloride 08/09/2023 102  98 - 107 mmol/L Final    Bicarbonate 08/09/2023 29  21 - 32 mmol/L Final    Anion Gap 08/09/2023 14  10 - 20 mmol/L Final    Urea Nitrogen 08/09/2023 20  6 - 23 mg/dL Final    Creatinine 08/09/2023 1.06 (H)  0.50 - 1.05 mg/dL Final    GFR Female 08/09/2023 56 (A)  >90 mL/min/1.73m2 Final     CALCULATIONS OF ESTIMATED GFR ARE PERFORMED   USING THE 2021 CKD-EPI STUDY REFIT EQUATION   WITHOUT THE RACE VARIABLE FOR THE IDMS-TRACEABLE   CREATININE METHODS.    https://jasn.asnjournals.org/content/early/2021/09/22/ASN.4563565203    Calcium 08/09/2023 9.7  8.6 - 10.3 mg/dL Final    Albumin 08/09/2023 4.2  3.4 - 5.0 g/dL Final    Alkaline Phosphatase 08/09/2023 141 (H)  33 - 136 U/L Final    Total Protein 08/09/2023 6.8  6.4 - 8.2 g/dL Final    AST 08/09/2023 18  9 - 39 U/L Final    Total Bilirubin 08/09/2023 0.5  0.0 - 1.2  mg/dL Final    ALT (SGPT) 08/09/2023 23  7 - 45 U/L Final     Patients treated with Sulfasalazine may generate    falsely decreased results for ALT.    WBC 08/09/2023 8.1  4.4 - 11.3 x10E9/L Final    RBC 08/09/2023 4.10  4.00 - 5.20 x10E12/L Final    Hemoglobin 08/09/2023 11.7 (L)  12.0 - 16.0 g/dL Final    Hematocrit 08/09/2023 37.1  36.0 - 46.0 % Final    MCV 08/09/2023 90  80 - 100 fL Final    MCHC 08/09/2023 31.5 (L)  32.0 - 36.0 g/dL Final    Platelets 08/09/2023 344  150 - 450 x10E9/L Final    RDW 08/09/2023 16.0 (H)  11.5 - 14.5 % Final    Hemoglobin A1C 08/09/2023 5.9 (A)  % Final         Diagnosis of Diabetes-Adults   Non-Diabetic: < or = 5.6%   Increased risk for developing diabetes: 5.7-6.4%   Diagnostic of diabetes: > or = 6.5%  .       Monitoring of Diabetes                Age (y)     Therapeutic Goal (%)   Adults:          >18           <7.0   Pediatrics:    13-18           <7.5                   7-12           <8.0                   0- 6            7.5-8.5   American Diabetes Association. Diabetes Care 33(S1), Jan 2010.    Estimated Average Glucose 08/09/2023 123  MG/DL Final        Assessment/Plan     Problem List Items Addressed This Visit       Anxiety and depression    Essential hypertension    Hyperglycemia    Mixed hyperlipidemia    Other specified hypothyroidism    Malignant neoplasm of endometrium (CMS/HCC) - Primary       Labs in 3-4 mo cmp hba1c    See pain mgmt  Low carb diet  Exercise changes   To Pods      Labs bf visit    The patient is aware that results will be forthcoming of ALL planned labs and or tests. If no results are received on my chart or by letter within 1 - 3 weeks, the patient is aware they need to call to obtain results, as this is not usual. Also, if any new conditions arise, or current condition worsens, it is understood that sooner appointment should be made or urgent care/convenient care or emergency room treatment should be sought depending on severity. Otherwise  follow up for recheck at regular intervals as we have discussed, at least yearly.

## 2023-08-17 DIAGNOSIS — F32.A ANXIETY AND DEPRESSION: ICD-10-CM

## 2023-08-17 DIAGNOSIS — F41.9 ANXIETY AND DEPRESSION: ICD-10-CM

## 2023-08-18 RX ORDER — ESCITALOPRAM OXALATE 20 MG/1
20 TABLET ORAL DAILY
Qty: 90 TABLET | Refills: 3 | Status: SHIPPED | OUTPATIENT
Start: 2023-08-18

## 2023-09-26 LAB
ALBUMIN (G/DL) IN SER/PLAS: 4.3 G/DL (ref 3.4–5)
ALBUMIN (MG/L) IN URINE: 20.3 MG/L
ALBUMIN/CREATININE (UG/MG) IN URINE: 28.2 UG/MG CRT (ref 0–30)
ANION GAP IN SER/PLAS: 14 MMOL/L (ref 10–20)
APPEARANCE, URINE: ABNORMAL
BACTERIA, URINE: ABNORMAL /HPF
BILIRUBIN, URINE: NEGATIVE
BLOOD, URINE: NEGATIVE
CALCIDIOL (25 OH VITAMIN D3) (NG/ML) IN SER/PLAS: 36 NG/ML
CALCIUM (MG/DL) IN SER/PLAS: 9.9 MG/DL (ref 8.6–10.3)
CARBON DIOXIDE, TOTAL (MMOL/L) IN SER/PLAS: 30 MMOL/L (ref 21–32)
CHLORIDE (MMOL/L) IN SER/PLAS: 99 MMOL/L (ref 98–107)
COLOR, URINE: YELLOW
CREATININE (MG/DL) IN SER/PLAS: 1.2 MG/DL (ref 0.5–1.05)
CREATININE (MG/DL) IN URINE: 72.1 MG/DL (ref 20–320)
GFR FEMALE: 48 ML/MIN/1.73M2
GLUCOSE (MG/DL) IN SER/PLAS: 118 MG/DL (ref 74–99)
GLUCOSE, URINE: NEGATIVE MG/DL
KETONES, URINE: NEGATIVE MG/DL
LEUKOCYTE ESTERASE, URINE: ABNORMAL
MUCUS, URINE: ABNORMAL /LPF
NITRITE, URINE: NEGATIVE
PH, URINE: 6 (ref 5–8)
PHOSPHATE (MG/DL) IN SER/PLAS: 3.3 MG/DL (ref 2.5–4.9)
POTASSIUM (MMOL/L) IN SER/PLAS: 4.5 MMOL/L (ref 3.5–5.3)
PROTEIN, URINE: NEGATIVE MG/DL
RBC, URINE: 16 /HPF (ref 0–5)
SODIUM (MMOL/L) IN SER/PLAS: 138 MMOL/L (ref 136–145)
SPECIFIC GRAVITY, URINE: 1.01 (ref 1–1.03)
SQUAMOUS EPITHELIAL CELLS, URINE: 11 /HPF
UREA NITROGEN (MG/DL) IN SER/PLAS: 21 MG/DL (ref 6–23)
UROBILINOGEN, URINE: <2 MG/DL (ref 0–1.9)
WBC CLUMPS, URINE: ABNORMAL /HPF
WBC, URINE: >182 /HPF (ref 0–5)

## 2023-09-27 LAB — PARATHYRIN INTACT (PG/ML) IN SER/PLAS: 51.2 PG/ML (ref 18.5–88)

## 2023-10-03 ENCOUNTER — LAB (OUTPATIENT)
Dept: LAB | Facility: LAB | Age: 73
End: 2023-10-03
Payer: MEDICARE

## 2023-10-03 DIAGNOSIS — N39.0 URINARY TRACT INFECTION, SITE NOT SPECIFIED: Primary | ICD-10-CM

## 2023-10-04 LAB — BACTERIA UR CULT: NORMAL

## 2023-10-11 DIAGNOSIS — E78.2 MIXED HYPERLIPIDEMIA: ICD-10-CM

## 2023-10-11 RX ORDER — ATORVASTATIN CALCIUM 80 MG/1
80 TABLET, FILM COATED ORAL DAILY
Qty: 90 TABLET | Refills: 3 | Status: SHIPPED | OUTPATIENT
Start: 2023-10-11

## 2023-10-17 PROBLEM — E66.01 CLASS 3 SEVERE OBESITY DUE TO EXCESS CALORIES WITH BODY MASS INDEX (BMI) OF 40.0 TO 44.9 IN ADULT (MULTI): Status: ACTIVE | Noted: 2023-10-17

## 2023-10-17 PROBLEM — N28.9 RENAL INSUFFICIENCY: Status: ACTIVE | Noted: 2023-10-17

## 2023-10-17 PROBLEM — H90.3 BILATERAL SENSORINEURAL HEARING LOSS: Status: ACTIVE | Noted: 2023-10-17

## 2023-10-17 PROBLEM — H93.19 TINNITUS: Status: ACTIVE | Noted: 2023-10-17

## 2023-10-17 PROBLEM — N18.30 STAGE 3 CHRONIC KIDNEY DISEASE (MULTI): Status: ACTIVE | Noted: 2023-05-15

## 2023-10-17 PROBLEM — E66.01 MORBID OBESITY WITH BMI OF 40.0-44.9, ADULT (MULTI): Status: ACTIVE | Noted: 2023-10-17

## 2023-10-17 PROBLEM — E66.813 CLASS 3 SEVERE OBESITY DUE TO EXCESS CALORIES WITH BODY MASS INDEX (BMI) OF 40.0 TO 44.9 IN ADULT: Status: ACTIVE | Noted: 2023-10-17

## 2023-10-17 PROBLEM — M12.9 ARTHROPATHY: Status: ACTIVE | Noted: 2023-10-17

## 2023-10-17 PROBLEM — R20.2 PARESTHESIA OF SKIN: Status: ACTIVE | Noted: 2022-12-05

## 2023-10-17 PROBLEM — D22.9 ATYPICAL MOLE: Status: ACTIVE | Noted: 2023-10-17

## 2023-10-17 PROBLEM — M54.16 LUMBAR RADICULOPATHY: Status: ACTIVE | Noted: 2023-10-17

## 2023-10-17 RX ORDER — GABAPENTIN 300 MG/1
300 CAPSULE ORAL 3 TIMES DAILY
COMMUNITY
Start: 2023-10-02

## 2023-10-17 RX ORDER — LIDOCAINE HYDROCHLORIDE 10 MG/ML
INJECTION, SOLUTION EPIDURAL; INFILTRATION; INTRACAUDAL; PERINEURAL
COMMUNITY
Start: 2023-05-15 | End: 2024-01-31 | Stop reason: ALTCHOICE

## 2023-10-17 RX ORDER — AMMONIUM LACTATE 12 G/100G
LOTION TOPICAL DAILY
COMMUNITY
Start: 2023-07-14

## 2023-10-17 RX ORDER — TRIAMCINOLONE ACETONIDE 1 MG/G
CREAM TOPICAL 2 TIMES DAILY
COMMUNITY
Start: 2023-07-14

## 2023-10-17 RX ORDER — SEMAGLUTIDE 1.34 MG/ML
0.25 INJECTION, SOLUTION SUBCUTANEOUS
COMMUNITY
End: 2023-11-06 | Stop reason: RX

## 2023-10-17 RX ORDER — ACETAMINOPHEN 500 MG
500 TABLET ORAL EVERY 4 HOURS PRN
COMMUNITY
Start: 2022-01-19

## 2023-10-17 RX ORDER — NIACIN (INOSITOL NIACINATE) 400(500MG)
1 CAPSULE ORAL
COMMUNITY
Start: 2019-05-29

## 2023-10-17 RX ORDER — ACETAMINOPHEN 500 MG
2000 TABLET ORAL
COMMUNITY

## 2023-10-17 RX ORDER — TRIAMCINOLONE ACETONIDE 40 MG/ML
40 INJECTION, SUSPENSION INTRA-ARTICULAR; INTRAMUSCULAR
COMMUNITY
Start: 2023-05-15 | End: 2024-01-31 | Stop reason: ALTCHOICE

## 2023-10-19 ENCOUNTER — OFFICE VISIT (OUTPATIENT)
Dept: ORTHOPEDIC SURGERY | Facility: CLINIC | Age: 73
End: 2023-10-19
Payer: MEDICARE

## 2023-10-19 DIAGNOSIS — M17.11 PRIMARY OSTEOARTHRITIS OF RIGHT KNEE: Primary | ICD-10-CM

## 2023-10-19 PROCEDURE — 3008F BODY MASS INDEX DOCD: CPT | Performed by: ORTHOPAEDIC SURGERY

## 2023-10-19 PROCEDURE — 1159F MED LIST DOCD IN RCRD: CPT | Performed by: ORTHOPAEDIC SURGERY

## 2023-10-19 PROCEDURE — 99024 POSTOP FOLLOW-UP VISIT: CPT | Performed by: ORTHOPAEDIC SURGERY

## 2023-10-19 PROCEDURE — 1160F RVW MEDS BY RX/DR IN RCRD: CPT | Performed by: ORTHOPAEDIC SURGERY

## 2023-10-19 PROCEDURE — 1125F AMNT PAIN NOTED PAIN PRSNT: CPT | Performed by: ORTHOPAEDIC SURGERY

## 2023-10-19 PROCEDURE — 1036F TOBACCO NON-USER: CPT | Performed by: ORTHOPAEDIC SURGERY

## 2023-10-19 PROCEDURE — 20610 DRAIN/INJ JOINT/BURSA W/O US: CPT | Performed by: ORTHOPAEDIC SURGERY

## 2023-10-19 RX ORDER — LIDOCAINE HYDROCHLORIDE 10 MG/ML
8 INJECTION INFILTRATION; PERINEURAL
Status: COMPLETED | OUTPATIENT
Start: 2023-10-19 | End: 2023-10-19

## 2023-10-19 RX ORDER — TRIAMCINOLONE ACETONIDE 40 MG/ML
40 INJECTION, SUSPENSION INTRA-ARTICULAR; INTRAMUSCULAR
Status: COMPLETED | OUTPATIENT
Start: 2023-10-19 | End: 2023-10-19

## 2023-10-19 RX ADMIN — TRIAMCINOLONE ACETONIDE 40 MG: 40 INJECTION, SUSPENSION INTRA-ARTICULAR; INTRAMUSCULAR at 10:49

## 2023-10-19 RX ADMIN — LIDOCAINE HYDROCHLORIDE 8 ML: 10 INJECTION INFILTRATION; PERINEURAL at 10:49

## 2023-10-19 ASSESSMENT — PAIN - FUNCTIONAL ASSESSMENT: PAIN_FUNCTIONAL_ASSESSMENT: 0-10

## 2023-10-19 ASSESSMENT — PAIN SCALES - GENERAL: PAINLEVEL_OUTOF10: 7

## 2023-10-19 NOTE — PROGRESS NOTES
Patient returns today overall she is done well with conservative treatment.  We did a cortisone injection she is almost 5 months relief.  She has about getting it repeated today    L Inj/Asp: R knee on 10/19/2023 10:49 AM  Indications: pain  Details: 22 G needle, anterolateral approach  Medications: 8 mL lidocaine 10 mg/mL (1 %); 40 mg triamcinolone acetonide 40 mg/mL  Outcome: tolerated well, no immediate complications  Procedure, treatment alternatives, risks and benefits explained, specific risks discussed. Consent was given by the patient. Immediately prior to procedure a time out was called to verify the correct patient, procedure, equipment, support staff and site/side marked as required. Patient was prepped and draped in the usual sterile fashion.          Plan:  Patient follow-up with us as needed  We discussed the for knee arthritis complaints return as our next step is trying gel injections/viscosupplementation.  I think is medically reasonable appropriate.  She will call us with a pre-CERT prior to her next visit.

## 2023-10-24 ENCOUNTER — OFFICE VISIT (OUTPATIENT)
Dept: GYNECOLOGIC ONCOLOGY | Facility: CLINIC | Age: 73
End: 2023-10-24
Payer: MEDICARE

## 2023-10-24 VITALS
RESPIRATION RATE: 16 BRPM | OXYGEN SATURATION: 94 % | TEMPERATURE: 97.2 F | WEIGHT: 281.09 LBS | SYSTOLIC BLOOD PRESSURE: 130 MMHG | BODY MASS INDEX: 42.74 KG/M2 | HEART RATE: 85 BPM | DIASTOLIC BLOOD PRESSURE: 83 MMHG

## 2023-10-24 DIAGNOSIS — C54.1 ENDOMETRIAL CANCER (MULTI): Primary | ICD-10-CM

## 2023-10-24 PROCEDURE — 1159F MED LIST DOCD IN RCRD: CPT | Performed by: NURSE PRACTITIONER

## 2023-10-24 PROCEDURE — 3075F SYST BP GE 130 - 139MM HG: CPT | Performed by: NURSE PRACTITIONER

## 2023-10-24 PROCEDURE — 99213 OFFICE O/P EST LOW 20 MIN: CPT | Performed by: NURSE PRACTITIONER

## 2023-10-24 PROCEDURE — 1126F AMNT PAIN NOTED NONE PRSNT: CPT | Performed by: NURSE PRACTITIONER

## 2023-10-24 PROCEDURE — 3008F BODY MASS INDEX DOCD: CPT | Performed by: NURSE PRACTITIONER

## 2023-10-24 PROCEDURE — 1036F TOBACCO NON-USER: CPT | Performed by: NURSE PRACTITIONER

## 2023-10-24 PROCEDURE — 3079F DIAST BP 80-89 MM HG: CPT | Performed by: NURSE PRACTITIONER

## 2023-10-24 PROCEDURE — 1160F RVW MEDS BY RX/DR IN RCRD: CPT | Performed by: NURSE PRACTITIONER

## 2023-10-24 ASSESSMENT — PATIENT HEALTH QUESTIONNAIRE - PHQ9
5. POOR APPETITE OR OVEREATING: 0
2. FEELING DOWN, DEPRESSED OR HOPELESS: NOT AT ALL
9. THOUGHTS THAT YOU WOULD BE BETTER OFF DEAD, OR OF HURTING YOURSELF: NOT AT ALL
7. TROUBLE CONCENTRATING ON THINGS, SUCH AS READING THE NEWSPAPER OR WATCHING TELEVISION: NOT AT ALL
4. FEELING TIRED OR HAVING LITTLE ENERGY: NOT AT ALL
9. THOUGHTS THAT YOU WOULD BE BETTER OFF DEAD, OR OF HURTING YOURSELF: NOT AT ALL
6. FEELING BAD ABOUT YOURSELF - OR THAT YOU ARE A FAILURE OR HAVE LET YOURSELF OR YOUR FAMILY DOWN: NOT AT ALL
SUM OF ALL RESPONSES TO PHQ9 QUESTIONS 1 AND 2: 0
2. FEELING DOWN, DEPRESSED OR HOPELESS: NOT AT ALL
6. FEELING BAD ABOUT YOURSELF - OR THAT YOU ARE A FAILURE OR HAVE LET YOURSELF OR YOUR FAMILY DOWN: 0
10. IF YOU CHECKED OFF ANY PROBLEMS, HOW DIFFICULT HAVE THESE PROBLEMS MADE IT FOR YOU TO DO YOUR WORK, TAKE CARE OF THINGS AT HOME, OR GET ALONG WITH OTHER PEOPLE: NOT DIFFICULT AT ALL
4. FEELING TIRED OR HAVING LITTLE ENERGY: NOT AT ALL
1. LITTLE INTEREST OR PLEASURE IN DOING THINGS: NOT AT ALL
5. POOR APPETITE OR OVEREATING: NOT AT ALL
8. MOVING OR SPEAKING SO SLOWLY THAT OTHER PEOPLE COULD HAVE NOTICED. OR THE OPPOSITE, BEING SO FIGETY OR RESTLESS THAT YOU HAVE BEEN MOVING AROUND A LOT MORE THAN USUAL: 0
2. FEELING DOWN, DEPRESSED, IRRITABLE, OR HOPELESS: NOT AT ALL
1. LITTLE INTEREST OR PLEASURE IN DOING THINGS: NOT AT ALL
3. TROUBLE FALLING OR STAYING ASLEEP OR SLEEPING TOO MUCH: NOT AT ALL
8. MOVING OR SPEAKING SO SLOWLY THAT OTHER PEOPLE COULD HAVE NOTICED. OR THE OPPOSITE, BEING SO FIGETY OR RESTLESS THAT YOU HAVE BEEN MOVING AROUND A LOT MORE THAN USUAL: NOT AT ALL
3. TROUBLE FALLING OR STAYING ASLEEP OR SLEEPING TOO MUCH: 0
2. FEELING DOWN, DEPRESSED, IRRITABLE, OR HOPELESS: 0
10. IF YOU CHECKED OFF ANY PROBLEMS, HOW DIFFICULT HAVE THESE PROBLEMS MADE IT FOR YOU TO DO YOUR WORK, TAKE CARE OF THINGS AT HOME, OR GET ALONG WITH OTHER PEOPLE: NOT DIFFICULT AT ALL
5. POOR APPETITE OR OVEREATING: NOT AT ALL
6. FEELING BAD ABOUT YOURSELF - OR THAT YOU ARE A FAILURE OR HAVE LET YOURSELF OR YOUR FAMILY DOWN: NOT AT ALL
7. TROUBLE CONCENTRATING ON THINGS, SUCH AS READING THE NEWSPAPER OR WATCHING TELEVISION: NOT AT ALL
4. FEELING TIRED OR HAVING LITTLE ENERGY: 0
1. LITTLE INTEREST OR PLEASURE IN DOING THINGS: 0
3. TROUBLE FALLING OR STAYING ASLEEP OR SLEEPING TOO MUCH: NOT AT ALL
1. LITTLE INTEREST OR PLEASURE IN DOING THINGS: NOT AT ALL
9. THOUGHTS THAT YOU WOULD BE BETTER OFF DEAD, OR OF HURTING YOURSELF: 0
7. TROUBLE CONCENTRATING ON THINGS, SUCH AS READING THE NEWSPAPER OR WATCHING TELEVISION: 0
SUM OF ALL RESPONSES TO PHQ QUESTIONS 1-9: 0
8. MOVING OR SPEAKING SO SLOWLY THAT OTHER PEOPLE COULD HAVE NOTICED. OR THE OPPOSITE, BEING SO FIGETY OR RESTLESS THAT YOU HAVE BEEN MOVING AROUND A LOT MORE THAN USUAL: NOT AT ALL
SUM OF ALL RESPONSES TO PHQ QUESTIONS 1-9: 0

## 2023-10-24 ASSESSMENT — COLUMBIA-SUICIDE SEVERITY RATING SCALE - C-SSRS
2. HAVE YOU ACTUALLY HAD ANY THOUGHTS OF KILLING YOURSELF?: NO
1. IN THE PAST MONTH, HAVE YOU WISHED YOU WERE DEAD OR WISHED YOU COULD GO TO SLEEP AND NOT WAKE UP?: NO
6. HAVE YOU EVER DONE ANYTHING, STARTED TO DO ANYTHING, OR PREPARED TO DO ANYTHING TO END YOUR LIFE?: NO

## 2023-10-24 ASSESSMENT — PAIN SCALES - GENERAL: PAINLEVEL: 0-NO PAIN

## 2023-10-24 ASSESSMENT — ENCOUNTER SYMPTOMS
LOSS OF SENSATION IN FEET: 0
OCCASIONAL FEELINGS OF UNSTEADINESS: 0
DEPRESSION: 0

## 2023-10-24 NOTE — PROGRESS NOTES
Patient ID: Emmy Chang is a 73 y.o. female.  Primary Care Provider: Fadumo Nixon MD    Subjective          Treatment History:    Emmy Chang  Referral from Dr. Stacy Haque  PCP:  Fadumo Nixon  70 yo with Stage 1B, Grade 1 endometrial cancer sp TVH BS on 1/19/22. MMR proficient.  Tumor History:   10/15/21 hysteroscopy/polypectomy  demonstrating endometrial hyperplasia with focal atypia, involving endometrial polyp   10/4/21 Pelvic ultrasound 1.1 cm endometrial thickness, normal sized uterus, no adnexal masses.  1/19/22 TVH, BS - final path. FIGO grade 1, endometrioid carcinoma, 55% myometrial invasion, no LVSI, MMR proficient  PMH:    Obesity with BMI 45  CKD stage G31/A2   Anxiety/depression, Rx Celexa  Mixed hyperlipidemia  Hypothyroidism  PSH:   Colonoscopy 2020  MOHS surgery  FH:  Colon cancer  Social Hx:      Objective    Visit Vitals  /83   Pulse 85   Temp 36.2 °C (97.2 °F)   Resp 16        Interval history: Patient is a 73 year old female now status post vaginal hysterectomy/BSO on 1/19/22 with final pathology revealing FIGO grade 1, stage 1B endometrioid carcinoma, 55% myoinvasion without LVSI. MMR intact. Patient denies any vaginal bleeding, pink tinged discharge. Patient denies any constipation or diarrhea.  Appetite has been good.  Energy levels are baseline.  Patient denies hematuria.  Patient denies urinary leakage or incontinence except with coughing and sneezing. Patient is not currently sexually active. Mammogram is up to date.        Physical Exam:    Constitutional: Doing well. MANUEL  Eyes: PERRL  ENMT: Moist mucus membranes  Head/Neck: Supple. Symmetrical  Cardiovascular: Regular, rate and rhythm. 2+ equal pulses of the extremities  Respiratory/Thorax: CTA. RRR. Chest rise symmetrical.  Gastrointestinal: Non-distended, soft, non-tender  Genitourinary:   Normal external female genitalia. No vulvar lesions noted  Speculum exam: Smooth vaginal walls without lesions or  masses. Vaginal cuff visualized without lesions. Surgically absent cervix.  Bimanual exam: Smooth vaginal wall without lesions or masses.  Surgically absent uterus, cervix, and adnexa.  Rectovaginal exam: smooth rectovaginal septum without lesions or masses  Musculoskeletal: ROM intact, no joint swelling, normal strength  Extremities: No edema  Neurological: Alert and oriented x 3. Pleasant and cooperative.  Lymphatic: No lymphadenopathy. No lymphedema  Psychological: Appropriate mood and behavior  Skin: Warm and dry, no lesions, no rashes    A complete review of systems was performed and all systems were normal except what is noted in the interval history.          Assessment/Plan  Patient is a 73 year old female now status post vaginal hysterectomy/BSO on 1/19/22 with final pathology revealing FIGO grade 1, stage 1B endometrioid carcinoma, 55% myoinvasion without LVSI. MMR intact. MANUEL 10 months.       PLAN:  F/U in 4 months or as needed  Physical examination was within normal limits today.  She is currently MANUEL.  We reviewed signs and symptoms of possible recurrence with the patient and she will call our office should she experience any of these.

## 2023-12-06 ENCOUNTER — LAB (OUTPATIENT)
Dept: LAB | Facility: LAB | Age: 73
End: 2023-12-06
Payer: MEDICARE

## 2023-12-06 DIAGNOSIS — I10 ESSENTIAL HYPERTENSION: ICD-10-CM

## 2023-12-06 DIAGNOSIS — F41.9 ANXIETY AND DEPRESSION: ICD-10-CM

## 2023-12-06 DIAGNOSIS — R73.9 HYPERGLYCEMIA: ICD-10-CM

## 2023-12-06 DIAGNOSIS — E78.2 MIXED HYPERLIPIDEMIA: ICD-10-CM

## 2023-12-06 DIAGNOSIS — C54.1 MALIGNANT NEOPLASM OF ENDOMETRIUM (MULTI): ICD-10-CM

## 2023-12-06 DIAGNOSIS — F32.A ANXIETY AND DEPRESSION: ICD-10-CM

## 2023-12-06 DIAGNOSIS — E03.8 OTHER SPECIFIED HYPOTHYROIDISM: ICD-10-CM

## 2023-12-06 LAB
ALBUMIN SERPL BCP-MCNC: 4.1 G/DL (ref 3.4–5)
ALP SERPL-CCNC: 129 U/L (ref 33–136)
ALT SERPL W P-5'-P-CCNC: 29 U/L (ref 7–45)
ANION GAP SERPL CALC-SCNC: 12 MMOL/L (ref 10–20)
AST SERPL W P-5'-P-CCNC: 23 U/L (ref 9–39)
BILIRUB SERPL-MCNC: 0.5 MG/DL (ref 0–1.2)
BUN SERPL-MCNC: 20 MG/DL (ref 6–23)
CALCIUM SERPL-MCNC: 9.8 MG/DL (ref 8.6–10.3)
CHLORIDE SERPL-SCNC: 101 MMOL/L (ref 98–107)
CO2 SERPL-SCNC: 31 MMOL/L (ref 21–32)
CREAT SERPL-MCNC: 1.06 MG/DL (ref 0.5–1.05)
EST. AVERAGE GLUCOSE BLD GHB EST-MCNC: 123 MG/DL
GFR SERPL CREATININE-BSD FRML MDRD: 56 ML/MIN/1.73M*2
GLUCOSE SERPL-MCNC: 115 MG/DL (ref 74–99)
HBA1C MFR BLD: 5.9 %
POTASSIUM SERPL-SCNC: 4.4 MMOL/L (ref 3.5–5.3)
PROT SERPL-MCNC: 6.5 G/DL (ref 6.4–8.2)
SODIUM SERPL-SCNC: 140 MMOL/L (ref 136–145)

## 2023-12-06 PROCEDURE — 80053 COMPREHEN METABOLIC PANEL: CPT

## 2023-12-06 PROCEDURE — 36415 COLL VENOUS BLD VENIPUNCTURE: CPT

## 2023-12-06 PROCEDURE — 83036 HEMOGLOBIN GLYCOSYLATED A1C: CPT

## 2023-12-08 DIAGNOSIS — R73.03 PREDIABETES: ICD-10-CM

## 2023-12-08 RX ORDER — TIRZEPATIDE 5 MG/.5ML
5 INJECTION, SOLUTION SUBCUTANEOUS
Qty: 6 ML | Refills: 1 | Status: SHIPPED | OUTPATIENT
Start: 2023-12-08 | End: 2024-03-20

## 2023-12-11 ENCOUNTER — APPOINTMENT (OUTPATIENT)
Dept: PRIMARY CARE | Facility: CLINIC | Age: 73
End: 2023-12-11
Payer: MEDICARE

## 2023-12-18 DIAGNOSIS — I10 ESSENTIAL HYPERTENSION: ICD-10-CM

## 2023-12-18 RX ORDER — TRIAMTERENE/HYDROCHLOROTHIAZID 37.5-25 MG
1 TABLET ORAL DAILY
Qty: 90 TABLET | Refills: 3 | Status: SHIPPED | OUTPATIENT
Start: 2023-12-18

## 2024-01-25 PROBLEM — Z85.42: Status: ACTIVE | Noted: 2023-08-14

## 2024-01-31 ENCOUNTER — HOSPITAL ENCOUNTER (OUTPATIENT)
Dept: RADIOLOGY | Facility: CLINIC | Age: 74
Discharge: HOME | End: 2024-01-31
Payer: MEDICARE

## 2024-01-31 ENCOUNTER — OFFICE VISIT (OUTPATIENT)
Dept: PRIMARY CARE | Facility: CLINIC | Age: 74
End: 2024-01-31
Payer: MEDICARE

## 2024-01-31 VITALS
WEIGHT: 285 LBS | RESPIRATION RATE: 16 BRPM | HEIGHT: 68 IN | SYSTOLIC BLOOD PRESSURE: 126 MMHG | TEMPERATURE: 97.4 F | HEART RATE: 80 BPM | OXYGEN SATURATION: 97 % | BODY MASS INDEX: 43.19 KG/M2 | DIASTOLIC BLOOD PRESSURE: 68 MMHG

## 2024-01-31 DIAGNOSIS — M54.12 CERVICAL RADICULOPATHY: ICD-10-CM

## 2024-01-31 DIAGNOSIS — F41.9 ANXIETY AND DEPRESSION: ICD-10-CM

## 2024-01-31 DIAGNOSIS — N18.30 STAGE 3 CHRONIC KIDNEY DISEASE, UNSPECIFIED WHETHER STAGE 3A OR 3B CKD (MULTI): ICD-10-CM

## 2024-01-31 DIAGNOSIS — E03.8 OTHER SPECIFIED HYPOTHYROIDISM: ICD-10-CM

## 2024-01-31 DIAGNOSIS — M19.012 PRIMARY OSTEOARTHRITIS OF LEFT SHOULDER: ICD-10-CM

## 2024-01-31 DIAGNOSIS — C54.1 ENDOMETRIAL CANCER (MULTI): Primary | ICD-10-CM

## 2024-01-31 DIAGNOSIS — Z00.00 PHYSICAL EXAM: ICD-10-CM

## 2024-01-31 DIAGNOSIS — R73.03 PREDIABETES: ICD-10-CM

## 2024-01-31 DIAGNOSIS — F32.A ANXIETY AND DEPRESSION: ICD-10-CM

## 2024-01-31 DIAGNOSIS — R73.9 HYPERGLYCEMIA: ICD-10-CM

## 2024-01-31 DIAGNOSIS — E78.2 MIXED HYPERLIPIDEMIA: ICD-10-CM

## 2024-01-31 DIAGNOSIS — Z12.31 ENCOUNTER FOR SCREENING MAMMOGRAM FOR MALIGNANT NEOPLASM OF BREAST: ICD-10-CM

## 2024-01-31 DIAGNOSIS — I10 ESSENTIAL HYPERTENSION: ICD-10-CM

## 2024-01-31 DIAGNOSIS — M25.512 ACUTE PAIN OF LEFT SHOULDER: ICD-10-CM

## 2024-01-31 DIAGNOSIS — Z00.00 ENCOUNTER FOR MEDICARE ANNUAL WELLNESS EXAM: ICD-10-CM

## 2024-01-31 DIAGNOSIS — Z00.00 ROUTINE GENERAL MEDICAL EXAMINATION AT HEALTH CARE FACILITY: ICD-10-CM

## 2024-01-31 PROBLEM — N28.9 RENAL INSUFFICIENCY: Status: RESOLVED | Noted: 2023-10-17 | Resolved: 2024-01-31

## 2024-01-31 PROBLEM — H16.139 ACTINIC KERATITIS: Status: ACTIVE | Noted: 2024-01-31

## 2024-01-31 PROCEDURE — 1036F TOBACCO NON-USER: CPT | Performed by: FAMILY MEDICINE

## 2024-01-31 PROCEDURE — 72072 X-RAY EXAM THORAC SPINE 3VWS: CPT | Performed by: RADIOLOGY

## 2024-01-31 PROCEDURE — 3074F SYST BP LT 130 MM HG: CPT | Performed by: FAMILY MEDICINE

## 2024-01-31 PROCEDURE — 17110 DESTRUCTION B9 LES UP TO 14: CPT | Performed by: FAMILY MEDICINE

## 2024-01-31 PROCEDURE — 99397 PER PM REEVAL EST PAT 65+ YR: CPT | Performed by: FAMILY MEDICINE

## 2024-01-31 PROCEDURE — 3078F DIAST BP <80 MM HG: CPT | Performed by: FAMILY MEDICINE

## 2024-01-31 PROCEDURE — 1126F AMNT PAIN NOTED NONE PRSNT: CPT | Performed by: FAMILY MEDICINE

## 2024-01-31 PROCEDURE — 73030 X-RAY EXAM OF SHOULDER: CPT | Mod: LT

## 2024-01-31 PROCEDURE — 1170F FXNL STATUS ASSESSED: CPT | Performed by: FAMILY MEDICINE

## 2024-01-31 PROCEDURE — 73030 X-RAY EXAM OF SHOULDER: CPT | Mod: LEFT SIDE | Performed by: RADIOLOGY

## 2024-01-31 PROCEDURE — G0439 PPPS, SUBSEQ VISIT: HCPCS | Performed by: FAMILY MEDICINE

## 2024-01-31 PROCEDURE — 72072 X-RAY EXAM THORAC SPINE 3VWS: CPT

## 2024-01-31 PROCEDURE — 1159F MED LIST DOCD IN RCRD: CPT | Performed by: FAMILY MEDICINE

## 2024-01-31 PROCEDURE — 72040 X-RAY EXAM NECK SPINE 2-3 VW: CPT

## 2024-01-31 PROCEDURE — 99497 ADVNCD CARE PLAN 30 MIN: CPT | Performed by: FAMILY MEDICINE

## 2024-01-31 PROCEDURE — 3008F BODY MASS INDEX DOCD: CPT | Performed by: FAMILY MEDICINE

## 2024-01-31 PROCEDURE — 72040 X-RAY EXAM NECK SPINE 2-3 VW: CPT | Performed by: RADIOLOGY

## 2024-01-31 PROCEDURE — 17003 DESTRUCT PREMALG LES 2-14: CPT | Performed by: FAMILY MEDICINE

## 2024-01-31 ASSESSMENT — ACTIVITIES OF DAILY LIVING (ADL)
TAKING_MEDICATION: INDEPENDENT
GROCERY_SHOPPING: INDEPENDENT
DRESSING: INDEPENDENT
MANAGING_FINANCES: INDEPENDENT
BATHING: INDEPENDENT
DOING_HOUSEWORK: INDEPENDENT

## 2024-01-31 ASSESSMENT — ENCOUNTER SYMPTOMS
DEPRESSION: 0
OCCASIONAL FEELINGS OF UNSTEADINESS: 0
LOSS OF SENSATION IN FEET: 1

## 2024-01-31 ASSESSMENT — PATIENT HEALTH QUESTIONNAIRE - PHQ9
1. LITTLE INTEREST OR PLEASURE IN DOING THINGS: NOT AT ALL
2. FEELING DOWN, DEPRESSED OR HOPELESS: NOT AT ALL
SUM OF ALL RESPONSES TO PHQ9 QUESTIONS 1 AND 2: 0

## 2024-01-31 NOTE — PROGRESS NOTES
Covid vax: UTD  Flu: UTD  Pneumo: UTD  RSV: UTD  Shingles: #2 due    CRC: due 2025  Mammogram: 5/2023-ordered  Pap: hysterectomy  Lmp: hysterectomy

## 2024-01-31 NOTE — PROGRESS NOTES
Subjective   Reason for Visit: Emmy Chang is a 73 y.o. female here for a Medicare Wellness visit.   Covid vax: UTD  Flu: UTD  Pneumo: UTD  RSV: UTD  Shingles: #2 due     CRC: due 2025  Mammogram: 5/2023-ordered  Pap: hysterectomy  Lmp: hysterectomy  Down 3# since 8-2023    Chief Complaint   Patient presents with    Medicare Annual Wellness Visit Subsequent    Prediabetes    Hypertension    Chronic Kidney Disease    Shoulder Pain     left    Suspicious Skin Lesion     Left cheek       CHECKLIST REVIEWED AND COMPLETE FOR AMW    Past Medical, Surgical, and Family History reviewed and updated in chart.    Reviewed all medications by prescribing practitioner or clinical pharmacist (such as prescriptions, OTCs, herbal therapies and supplements) and documented in the medical record.  Medicare Annual Wellness Visit Subsequent, Prediabetes, Hypertension, Chronic Kidney Disease, Shoulder Pain (left), and Suspicious Skin Lesion (Left cheek)  HPI    Patient Self Assessment of Health Status  Patient Self Assessment: Good    Nutrition and Exercise  Current Diet: HEALTHY Diet always best, minimizing excess carbs  Adequate Fluid Intake: Yes  Caffeine: -aware to minimize intake  Exercise Frequency: Regularly advised    Functional Ability/Level of Safety  Cognitive Impairment Observed: No cognitive impairment observed    Home Safety Risk Factors: None    Patient Care Team:  Fadumo Nixon MD as PCP - General    HPI  Patient Active Problem List   Diagnosis    Adenomatous polyp of descending colon    Anemia    Anxiety and depression    Osteoarthritis    CKD (chronic kidney disease) stage 3, GFR 30-59 ml/min (CMS/[facility])    Degenerative cervical disc    Essential hypertension    Hematuria    History of malignant neoplasm of skin    Hyperglycemia    Mixed hyperlipidemia    Other specified hypothyroidism    Polyneuropathy    Prediabetes    Scalp psoriasis    Spinal stenosis of lumbar region    Hx of malignant neoplasm of  "endometrium    Spondylosis of lumbar spine    Paresthesia of skin    Stage 3 chronic kidney disease (CMS/HCC)    Arthropathy    Atypical mole    Bilateral sensorineural hearing loss    Class 3 severe obesity due to excess calories with body mass index (BMI) of 40.0 to 44.9 in adult (CMS/HCC)    Lumbar radiculopathy, chronic    Morbid obesity with BMI of 40.0-44.9, adult (CMS/HCC)    Tinnitus      Past Surgical History:   Procedure Laterality Date    COLONOSCOPY W/ POLYPECTOMY  10/2020    mucosal fold-due 2025    SKIN SURGERY  2016    MOHS x 5-last 2016    TONSILLECTOMY  1955    TOTAL ABDOMINAL HYSTERECTOMY W/ BILATERAL SALPINGOOPHORECTOMY  01/2022    endometrial CA     No sz mi or cva    Review of Systems  This patient has   NO history of recent Covid nor flu symptoms,  NO Fever nor chills,  NO Chest pain, shortness of breath nor paroxysmal nocturnal dyspnea,  NO Nausea, vomiting, nor diarrhea,  NO Hematochezia nor melena,  NO Dysuria, hematuria, nor new incontinence issues  NO new severe headaches nor neurological complaints,  NO new issues with anxiety nor depression nor new psychiatric complaints,  NO suicidal nor homicidal ideations.     OBJECTIVE:  /68   Pulse 80   Temp 36.3 °C (97.4 °F) (Temporal)   Resp 16   Ht 1.727 m (5' 8\")   Wt 129 kg (285 lb)   LMP  (LMP Unknown)   SpO2 97%   BMI 43.33 kg/m²      General:  alert, oriented, no acute distress.  No obvious skin rashes noted.   No gait disturbance noted.    Mood is pleasant, not tearful, no signs of emotional distress.  Not appearing intoxicated or altered.   No voiced delusions,   Normal, appropriate behavior.    HEENT: Normocephalic, atraumatic,   Pupils round, reactive to light  Extraocular motions intact and wnl  Tympanic membranes normal    Neck: no nuchal rigidity  No masses palpable.  No carotid bruits.  No thyromegaly.    Respiratory: Equal breath sounds  No wheezes,    rales,    nor rhonchi  No respiratory distress.    Heart: Regular " rate and rhythm, no    murmurs  no rubs/gallops    Abdomen: no masses palpable, no rebound nor guarding, no rebound nor guarding.overwt    Extremities: NO cyanosis noted, no clubbing.   No edema noted.  2+dorsalis pedis pulses.    Normal-not antalgic, steady gait.  Fflex abd and int rotation WNL bilat pain w ROM on L and int rotation is difficult      Lab on 12/06/2023   Component Date Value Ref Range Status    Glucose 12/06/2023 115 (H)  74 - 99 mg/dL Final    Sodium 12/06/2023 140  136 - 145 mmol/L Final    Potassium 12/06/2023 4.4  3.5 - 5.3 mmol/L Final    Chloride 12/06/2023 101  98 - 107 mmol/L Final    Bicarbonate 12/06/2023 31  21 - 32 mmol/L Final    Anion Gap 12/06/2023 12  10 - 20 mmol/L Final    Urea Nitrogen 12/06/2023 20  6 - 23 mg/dL Final    Creatinine 12/06/2023 1.06 (H)  0.50 - 1.05 mg/dL Final    eGFR 12/06/2023 56 (L)  >60 mL/min/1.73m*2 Final    Calculations of estimated GFR are performed using the 2021 CKD-EPI Study Refit equation without the race variable for the IDMS-Traceable creatinine methods.  https://jasn.asnjournals.org/content/early/2021/09/22/ASN.4878670544    Calcium 12/06/2023 9.8  8.6 - 10.3 mg/dL Final    Albumin 12/06/2023 4.1  3.4 - 5.0 g/dL Final    Alkaline Phosphatase 12/06/2023 129  33 - 136 U/L Final    Total Protein 12/06/2023 6.5  6.4 - 8.2 g/dL Final    AST 12/06/2023 23  9 - 39 U/L Final    Bilirubin, Total 12/06/2023 0.5  0.0 - 1.2 mg/dL Final    ALT 12/06/2023 29  7 - 45 U/L Final    Patients treated with Sulfasalazine may generate falsely decreased results for ALT.    Hemoglobin A1C 12/06/2023 5.9 (H)  see below % Final    Estimated Average Glucose 12/06/2023 123  Not Established mg/dL Final        Assessment/Plan     Problem List Items Addressed This Visit       Anxiety and depression    CKD (chronic kidney disease) stage 3, GFR 30-59 ml/min (CMS/[facility])    Essential hypertension    Hyperglycemia    Mixed hyperlipidemia    Other specified hypothyroidism    Prediabetes      Other Visit Diagnoses       Encounter for Medicare annual wellness exam        Encounter for screening mammogram for malignant neoplasm of breast        Relevant Orders    BI mammo bilateral screening tomosynthesis          Advance Care Planning Note   Discussion Date: 01/31/24   Discussion Participants: patient    The patient wishes to discuss Advance Care Planning today and the following is a brief summary of our discussion.     Patient has capacity to make their own medical decisions: Yes  Health Care Agent/Surrogate Decision Maker documented in chart: Yes  Documents on file and valid:  Advance Directive/Living Will: no   Health Care Power of : no  Communication of Medical Status/Prognosis:   yes   Communication of Treatment Goals/Options:   yes  Treatment Decisions  yes  Time Statement: Total face to face time spent on advance care planning was <30 minutes with <30 minutes spent in counseling, including the explanation.    SEE ME AT NEXT REGULARLY SCHEDULED VISIT-sooner if condition deteriorates or new problems arise.  Full code desired  No depression  No dementia  Will give info on ACP  Offered cardiac ct  Has been on mounjaro 1mo 5mg  Hba1c 5.9  Will call when wants 7.5  This medications risks, benefits, and alternatives were discussed with patient at length.  If any unwanted side effects occur-discontinue medicine and call the office for discussion.  May need ortho  To PT first  Cmp hba1c  Sks and ?aks on L face  Consented and freeze x 3sec each if non resolved will re-tcx or bx end day

## 2024-02-05 ENCOUNTER — APPOINTMENT (OUTPATIENT)
Dept: RADIOLOGY | Facility: CLINIC | Age: 74
End: 2024-02-05
Payer: MEDICARE

## 2024-02-08 ENCOUNTER — HOSPITAL ENCOUNTER (OUTPATIENT)
Dept: RADIOLOGY | Facility: CLINIC | Age: 74
Discharge: HOME | End: 2024-02-08
Payer: MEDICARE

## 2024-02-08 DIAGNOSIS — Z00.00 PHYSICAL EXAM: ICD-10-CM

## 2024-02-08 PROCEDURE — 75571 CT HRT W/O DYE W/CA TEST: CPT

## 2024-02-19 ENCOUNTER — APPOINTMENT (OUTPATIENT)
Dept: PHYSICAL THERAPY | Facility: CLINIC | Age: 74
End: 2024-02-19
Payer: MEDICARE

## 2024-02-26 NOTE — PROGRESS NOTES
Patient ID: Emmy Chang is a 73 y.o. female.  Primary Care Provider: Fadumo Nixon MD    Subjective    Emmy Chang  Referral from Dr. Stacy Haque  PCP:  Fadumo Nixon  70 yo with Stage 1B, Grade 1 endometrial cancer sp TVH BS on 1/19/22. MMR proficient.  Tumor History:   10/15/21 hysteroscopy/polypectomy  demonstrating endometrial hyperplasia with focal atypia, involving endometrial polyp   10/4/21 Pelvic ultrasound 1.1 cm endometrial thickness, normal sized uterus, no adnexal masses.  1/19/22 TVH, BS - final path. FIGO grade 1, endometrioid carcinoma, 55% myometrial invasion, no LVSI, MMR proficient  PMH:    Obesity with BMI 45  CKD stage G31/A2   Anxiety/depression, Rx Celexa  Mixed hyperlipidemia  Hypothyroidism  PSH:   Colonoscopy 2020  MOHS surgery  FH:  Colon cancer      Objective    Visit Vitals  /73 (BP Location: Right arm, Patient Position: Sitting, BP Cuff Size: Adult)   Pulse 80   Temp 36.3 °C (97.3 °F) (Temporal)   Resp 16          Interval history: Patient is a 73 year old female now status post vaginal hysterectomy/BSO on 1/19/22 with final pathology revealing FIGO grade 1, stage 1B endometrioid carcinoma, 55% myoinvasion without LVSI. MMR intact. Patient denies any vaginal bleeding, pink tinged discharge. Patient denies any constipation or diarrhea.  Appetite has been good.  Energy levels are baseline.  Patient denies hematuria.  Patient has some stress urinary leakage, wearing a pad all the time.  Has some vulvar pruritus, uses Vagisil as needed.  Mammogram is up to date.  Patient is not currently sexually active.        Physical Exam:    Constitutional: Doing well. MANUEL  Eyes: PERRL  ENMT: Moist mucus membranes  Head/Neck: Supple. Symmetrical  Cardiovascular: Regular, rate and rhythm. 2+ equal pulses of the extremities  Respiratory/Thorax: CTA. RRR. Chest rise symmetrical.  Gastrointestinal: Non-distended, soft, non-tender  Genitourinary:   Normal external female  genitalia. No vulvar lesions noted, erythema noted.   Speculum exam: Smooth vaginal walls without lesions or masses. Vaginal cuff visualized without lesions. Surgically absent cervix.  Bimanual exam: Smooth vaginal wall without lesions or masses.  Surgically absent uterus, cervix, and adnexa.  Rectovaginal exam: smooth rectovaginal septum without lesions or masses  Musculoskeletal: ROM intact, no joint swelling, normal strength  Extremities: No edema  Neurological: Alert and oriented x 3. Pleasant and cooperative.  Lymphatic: No lymphadenopathy. No lymphedema  Psychological: Appropriate mood and behavior  Skin: Warm and dry, no lesions, no rashes    A complete review of systems was performed and all systems were normal except what is noted in the interval history.          Assessment/Plan  Patient is a 73 year old female now status post vaginal hysterectomy/BSO on 1/19/22 with final pathology revealing FIGO grade 1, stage 1B endometrioid carcinoma, 55% myoinvasion without LVSI. MMR intact.  MANUEL 2 years.     PLAN:  F/U in 6 months or as needed  Physical examination was within normal limits today.  She is currently MANUEL.  We reviewed signs and symptoms of possible recurrence with the patient and she will call our office should she experience any of these.

## 2024-02-27 ENCOUNTER — OFFICE VISIT (OUTPATIENT)
Dept: GYNECOLOGIC ONCOLOGY | Facility: CLINIC | Age: 74
End: 2024-02-27
Payer: MEDICARE

## 2024-02-27 VITALS
HEART RATE: 80 BPM | SYSTOLIC BLOOD PRESSURE: 129 MMHG | WEIGHT: 284.17 LBS | RESPIRATION RATE: 16 BRPM | DIASTOLIC BLOOD PRESSURE: 73 MMHG | BODY MASS INDEX: 43.21 KG/M2 | OXYGEN SATURATION: 94 % | TEMPERATURE: 97.3 F

## 2024-02-27 DIAGNOSIS — C54.1 ENDOMETRIAL CANCER (MULTI): Primary | ICD-10-CM

## 2024-02-27 PROCEDURE — 3078F DIAST BP <80 MM HG: CPT | Performed by: NURSE PRACTITIONER

## 2024-02-27 PROCEDURE — 99213 OFFICE O/P EST LOW 20 MIN: CPT | Performed by: NURSE PRACTITIONER

## 2024-02-27 PROCEDURE — 1126F AMNT PAIN NOTED NONE PRSNT: CPT | Performed by: NURSE PRACTITIONER

## 2024-02-27 PROCEDURE — 1159F MED LIST DOCD IN RCRD: CPT | Performed by: NURSE PRACTITIONER

## 2024-02-27 PROCEDURE — 3074F SYST BP LT 130 MM HG: CPT | Performed by: NURSE PRACTITIONER

## 2024-02-27 PROCEDURE — 3008F BODY MASS INDEX DOCD: CPT | Performed by: NURSE PRACTITIONER

## 2024-02-27 PROCEDURE — 1036F TOBACCO NON-USER: CPT | Performed by: NURSE PRACTITIONER

## 2024-02-27 ASSESSMENT — PAIN SCALES - GENERAL: PAINLEVEL: 0-NO PAIN

## 2024-03-12 ENCOUNTER — PATIENT MESSAGE (OUTPATIENT)
Dept: PRIMARY CARE | Facility: CLINIC | Age: 74
End: 2024-03-12
Payer: MEDICARE

## 2024-03-12 DIAGNOSIS — R73.03 PREDIABETES: ICD-10-CM

## 2024-03-12 RX ORDER — TIRZEPATIDE 7.5 MG/.5ML
7.5 INJECTION, SOLUTION SUBCUTANEOUS
Qty: 6 ML | Refills: 0 | Status: SHIPPED | OUTPATIENT
Start: 2024-03-12 | End: 2024-03-20

## 2024-03-12 NOTE — TELEPHONE ENCOUNTER
From: Emmy Chang  To: Fadumo Nixon MD  Sent: 3/12/2024 11:20 AM EDT  Subject: Mounjaro    During my last visit we discussed increasing the dose of mounjaro. I now have 3 pens remaining of 5 mg. Please submit a prescription for the next level dosage to [facility] for a 90 day supply. Thank you.

## 2024-03-13 ENCOUNTER — TELEPHONE (OUTPATIENT)
Dept: PRIMARY CARE | Facility: CLINIC | Age: 74
End: 2024-03-13
Payer: MEDICARE

## 2024-03-13 NOTE — TELEPHONE ENCOUNTER
Hermelinda,    Patient reports recently prescribed Mounjaro needs a prior auth. Are you able to help assist.    Thank you,  Xochitl

## 2024-03-14 NOTE — TELEPHONE ENCOUNTER
Prior authorization was denied for Mounjaro. Spoke to patient to notify of denial. Patient is to follow-up with PCP, and continue all other medications as prescribed. Advised patient to call [facility] at [phone] if any additional assistance is needed.

## 2024-03-20 DIAGNOSIS — R73.9 HYPERGLYCEMIA: Primary | ICD-10-CM

## 2024-03-20 NOTE — TELEPHONE ENCOUNTER
Spoke with patient today regarding prior authorization denial for Mounjaro. Patient's insurance will not cover unless patient is a Type II diabetic with A1c > 6.5%. Advised patient has the option to pay out of pocket, however out of pocket expense is quite costly. Other alternative for medication in same class is generic semaglutide compounded at [facility] pharmacy, however $200-$250/month is still expensive.    Patient reports was previously on Jardiance (no clear indication why medication was discontinued - likely cost as patient denies any side effects). Patient agreeable to re-try Jardiance 10 mg daily. Prescription pended to PCP for [facility].    Thank you,  Hermelinda Thacker, PharmD

## 2024-03-28 ENCOUNTER — OFFICE VISIT (OUTPATIENT)
Dept: ORTHOPEDIC SURGERY | Facility: CLINIC | Age: 74
End: 2024-03-28
Payer: MEDICARE

## 2024-03-28 DIAGNOSIS — M17.11 PRIMARY OSTEOARTHRITIS OF RIGHT KNEE: Primary | ICD-10-CM

## 2024-03-28 PROCEDURE — 3008F BODY MASS INDEX DOCD: CPT | Performed by: ORTHOPAEDIC SURGERY

## 2024-03-28 PROCEDURE — 1159F MED LIST DOCD IN RCRD: CPT | Performed by: ORTHOPAEDIC SURGERY

## 2024-03-28 PROCEDURE — 99213 OFFICE O/P EST LOW 20 MIN: CPT | Performed by: ORTHOPAEDIC SURGERY

## 2024-03-28 PROCEDURE — 1036F TOBACCO NON-USER: CPT | Performed by: ORTHOPAEDIC SURGERY

## 2024-03-28 NOTE — PROGRESS NOTES
History of Present Illness   Chief Complaint   Patient presents with    Right Knee - Follow-up     OA  Wants to precert gel        History of Present Illness   Patient here today unfortunately starting to bother her again.  We done 2 separate cortisone injections over the last year for her mild arthritis.  They do work fairly well and they last for just about 5 months or so.  General well options, getting gel injections as viscosupplementation.  Imaging  Radiographs Knee: No acute fractures or dislocations.  Grade 3/4 MED compartment arthritis  Assessment:   Right knee arthritis flare    Plan:  We reviewed the role of imaging, physical therapy, injections and the time frame to healing and correlation with outcome.  Get her appreciated for gel injections as viscosupplementation.  Think is medically reasonable and appropriate.  Will see her back as soon as we have authorization for injection.    NSAID: Unable to take secondary to stage III kidney disease.  Tylenol for pain relief  Ice: 30 minutes on and off  Exercise home program: Medically directed knee therapy / Handout given  She has a patellar stabilizing brace     Physical Exam  Well-nourished, well-developed. No acute distress. Alert and oriented x3. Responds appropriately to questioning. Good mood. Normal affect.  Physical Exam  Right knee:  Skin healthy and intact  No gross swelling or ecchymosis  Trace to 1+ Effusion: Crepitance with range of motion  ROM: Flexion to 110.  Some minor pain in full extension.  Some minor pain along the joint line  Positive Rocky´s test/PositiveApley Grind  Neurovascular exam normal distally  Palpable pedal pulse and good cap refill     Review of Systems   GENERAL: Negative for malaise, significant weight loss, fever  MUSCULOSKELETAL: See HPI  NEURO:  Negative     Past Medical History:   Diagnosis Date    BCC (basal cell carcinoma)     Endometrial hyperplasia with atypia     History of mammogram 05/11/2023    cat 1        Medication Documentation Review Audit       Reviewed by Melissa Brunner, MA (Medical Assistant) on 03/28/24 at 0826      Medication Order Taking? Sig Documenting Provider Last Dose Status   acetaminophen (Tylenol) 500 mg tablet [id number] No Take 1 tablet (500 mg) by mouth every 4 hours if needed for mild pain (1 - 3). Wilmer Lakhani MD Taking Active   ammonium lactate (Lac-Hydrin) 12 % lotion [id number] No Apply topically once daily. Wilmer Lakhani MD Taking Active   aspirin 81 mg EC tablet [id number] No Take 1 tablet (81 mg) by mouth once daily. Wilmer Lakhani MD Taking Active   atenolol (Tenormin) 50 mg tablet [id number] No TAKE 1 TABLET DAILY Fadumo Nixon MD Taking Active   atorvastatin (Lipitor) 80 mg tablet [id number] No TAKE 1 TABLET DAILY Fadumo Nixon MD Taking Active   cholecalciferol (Vitamin D-3) 50 mcg (2,000 unit) capsule [id number] No Take 1 capsule (50 mcg) by mouth. Wilmer Lakhani MD Taking Active   coenzyme Q-10 100 mg capsule [id number] No Take 1 capsule (100 mg) by mouth once daily. Wilmer Lakhani MD Taking Active   coenzyme Q10-vitamin E 100-5 mg-unit capsule [id number] No Take 1 capsule by mouth. Wilmer Lakhani MD Taking Active   empagliflozin (Jardiance) 10 mg [id number]  Take 1 tablet (10 mg) by mouth once daily. Fadumo Nixon MD  Active   escitalopram (Lexapro) 20 mg tablet [id number] No TAKE 1 TABLET DAILY Fadumo Nixon MD Taking Active   gabapentin (Neurontin) 300 mg capsule [id number] No Take 1 capsule (300 mg) by mouth 3 times a day. Wilmer Lakhani MD Taking Active   levothyroxine (Synthroid, Levoxyl) 88 mcg tablet [id number] No TAKE 1 TABLET EVERY MORNING BEFORE BREAKFAST ON AN EMPTY STOMACH Fadumo Nixon MD Taking Active   metFORMIN (Glucophage) 500 mg tablet [id number] No Take 1 tablet (500 mg) by mouth once daily with breakfast. Wilmer Lakhani MD Taking Active   multivitamin-min-iron-FA-vit K (Adults  Multivitamin) 18 mg iron-400 mcg-25 mcg tablet [id number] No Take by mouth. Historical Provider, MD Taking Active   triamcinolone (Kenalog) 0.1 % cream [id number] No Apply topically 2 times a day. Historical Provider, MD Taking Active   triamterene-hydrochlorothiazid (Maxzide-25) 37.5-25 mg tablet [id number] No TAKE 1 TABLET DAILY Fadumo Nixon MD Taking Active                    Allergies   Allergen Reactions    Banana Angioedema    Cantaloupe Angioedema     Cantalope    Watermelon Angioedema       Social History     Socioeconomic History    Marital status:      Spouse name: Not on file    Number of children: Not on file    Years of education: Not on file    Highest education level: Not on file   Occupational History    Not on file   Tobacco Use    Smoking status: Never    Smokeless tobacco: Never   Substance and Sexual Activity    Alcohol use: Not Currently    Drug use: Never    Sexual activity: Not on file   Other Topics Concern    Not on file   Social History Narrative    Not on file     Social Determinants of Health     Financial Resource Strain: Not on file   Food Insecurity: Not on file   Transportation Needs: Not on file   Physical Activity: Not on file   Stress: Not on file   Social Connections: Not on file   Intimate Partner Violence: Not on file   Housing Stability: Not on file       Past Surgical History:   Procedure Laterality Date    COLONOSCOPY W/ POLYPECTOMY  10/2020    mucosal fold-due 2025    SKIN SURGERY  2016    MOHS x 5-last 2016    TONSILLECTOMY  1955    TOTAL ABDOMINAL HYSTERECTOMY W/ BILATERAL SALPINGOOPHORECTOMY  01/2022    endometrial CA       No results found.

## 2024-04-18 ENCOUNTER — OFFICE VISIT (OUTPATIENT)
Dept: ORTHOPEDIC SURGERY | Facility: CLINIC | Age: 74
End: 2024-04-18
Payer: MEDICARE

## 2024-04-18 DIAGNOSIS — M17.11 PRIMARY OSTEOARTHRITIS OF RIGHT KNEE: Primary | ICD-10-CM

## 2024-04-18 PROCEDURE — 1159F MED LIST DOCD IN RCRD: CPT | Performed by: ORTHOPAEDIC SURGERY

## 2024-04-18 PROCEDURE — 3008F BODY MASS INDEX DOCD: CPT | Performed by: ORTHOPAEDIC SURGERY

## 2024-04-18 PROCEDURE — 20610 DRAIN/INJ JOINT/BURSA W/O US: CPT | Performed by: ORTHOPAEDIC SURGERY

## 2024-04-19 NOTE — PROGRESS NOTES
L Inj/Asp: R knee on 4/19/2024 5:42 PM  Indications: pain  Details: 20 G needle, anterolateral approach  Medications: 20 mg sodium hyaluronate 10 mg/mL(mw 2.4 -3.6 million)  Outcome: tolerated well, no immediate complications  Procedure, treatment alternatives, risks and benefits explained, specific risks discussed. Consent was given by the patient. Immediately prior to procedure a time out was called to verify the correct patient, procedure, equipment, support staff and site/side marked as required. Patient was prepped and draped in the usual sterile fashion.

## 2024-04-22 ENCOUNTER — PATIENT MESSAGE (OUTPATIENT)
Dept: PRIMARY CARE | Facility: CLINIC | Age: 74
End: 2024-04-22
Payer: MEDICARE

## 2024-04-22 DIAGNOSIS — R73.03 PREDIABETES: ICD-10-CM

## 2024-04-22 RX ORDER — METFORMIN HYDROCHLORIDE 500 MG/1
500 TABLET ORAL
Qty: 90 TABLET | Refills: 3 | Status: SHIPPED | OUTPATIENT
Start: 2024-04-22

## 2024-04-22 NOTE — TELEPHONE ENCOUNTER
From: Emmy Chang  To: Fadumo Nixon  Sent: 4/22/2024 12:02 PM EDT  Subject: Metformin refill    I currently have 0 refills for Metformin 500mg. I was having it filled at [facility] but would like a new prescription sent to [facility].   Thank you

## 2024-04-30 ENCOUNTER — LAB (OUTPATIENT)
Dept: LAB | Facility: LAB | Age: 74
End: 2024-04-30
Payer: MEDICARE

## 2024-04-30 DIAGNOSIS — E83.52 HYPERCALCEMIA: ICD-10-CM

## 2024-04-30 DIAGNOSIS — N39.0 URINARY TRACT INFECTION, SITE NOT SPECIFIED: Primary | ICD-10-CM

## 2024-04-30 LAB
25(OH)D3 SERPL-MCNC: 35 NG/ML (ref 30–100)
ALBUMIN SERPL BCP-MCNC: 4.3 G/DL (ref 3.4–5)
ANION GAP SERPL CALC-SCNC: 13 MMOL/L (ref 10–20)
APPEARANCE UR: CLEAR
BACTERIA #/AREA URNS AUTO: ABNORMAL /HPF
BILIRUB UR STRIP.AUTO-MCNC: NEGATIVE MG/DL
BUN SERPL-MCNC: 21 MG/DL (ref 6–23)
CALCIUM SERPL-MCNC: 9.7 MG/DL (ref 8.6–10.3)
CHLORIDE SERPL-SCNC: 102 MMOL/L (ref 98–107)
CO2 SERPL-SCNC: 28 MMOL/L (ref 21–32)
COLOR UR: YELLOW
CREAT SERPL-MCNC: 1.15 MG/DL (ref 0.5–1.05)
CREAT UR-MCNC: 73.1 MG/DL (ref 20–320)
EGFRCR SERPLBLD CKD-EPI 2021: 51 ML/MIN/1.73M*2
GLUCOSE SERPL-MCNC: 96 MG/DL (ref 74–99)
GLUCOSE UR STRIP.AUTO-MCNC: ABNORMAL MG/DL
KETONES UR STRIP.AUTO-MCNC: NEGATIVE MG/DL
LEUKOCYTE ESTERASE UR QL STRIP.AUTO: ABNORMAL
MICROALBUMIN UR-MCNC: 17.8 MG/L
MICROALBUMIN/CREAT UR: 24.4 UG/MG CREAT
MUCOUS THREADS #/AREA URNS AUTO: ABNORMAL /LPF
NITRITE UR QL STRIP.AUTO: NEGATIVE
PH UR STRIP.AUTO: 5 [PH]
PHOSPHATE SERPL-MCNC: 3.5 MG/DL (ref 2.5–4.9)
POTASSIUM SERPL-SCNC: 4.7 MMOL/L (ref 3.5–5.3)
PROT UR STRIP.AUTO-MCNC: NEGATIVE MG/DL
PTH-INTACT SERPL-MCNC: 51.5 PG/ML (ref 18.5–88)
RBC # UR STRIP.AUTO: NEGATIVE /UL
RBC #/AREA URNS AUTO: ABNORMAL /HPF
SODIUM SERPL-SCNC: 138 MMOL/L (ref 136–145)
SP GR UR STRIP.AUTO: 1.02
SQUAMOUS #/AREA URNS AUTO: ABNORMAL /HPF
UROBILINOGEN UR STRIP.AUTO-MCNC: <2 MG/DL
WBC #/AREA URNS AUTO: ABNORMAL /HPF

## 2024-04-30 PROCEDURE — 81001 URINALYSIS AUTO W/SCOPE: CPT

## 2024-04-30 PROCEDURE — 82306 VITAMIN D 25 HYDROXY: CPT

## 2024-04-30 PROCEDURE — 36415 COLL VENOUS BLD VENIPUNCTURE: CPT

## 2024-04-30 PROCEDURE — 82570 ASSAY OF URINE CREATININE: CPT

## 2024-04-30 PROCEDURE — 83970 ASSAY OF PARATHORMONE: CPT

## 2024-04-30 PROCEDURE — 82043 UR ALBUMIN QUANTITATIVE: CPT

## 2024-04-30 PROCEDURE — 80069 RENAL FUNCTION PANEL: CPT

## 2024-05-02 ENCOUNTER — OFFICE VISIT (OUTPATIENT)
Dept: ORTHOPEDIC SURGERY | Facility: CLINIC | Age: 74
End: 2024-05-02
Payer: MEDICARE

## 2024-05-02 DIAGNOSIS — M17.11 PRIMARY OSTEOARTHRITIS OF RIGHT KNEE: Primary | ICD-10-CM

## 2024-05-02 PROCEDURE — 3008F BODY MASS INDEX DOCD: CPT | Performed by: ORTHOPAEDIC SURGERY

## 2024-05-02 PROCEDURE — 1159F MED LIST DOCD IN RCRD: CPT | Performed by: ORTHOPAEDIC SURGERY

## 2024-05-03 PROCEDURE — 20610 DRAIN/INJ JOINT/BURSA W/O US: CPT | Performed by: ORTHOPAEDIC SURGERY

## 2024-05-03 NOTE — PROGRESS NOTES
L Inj/Asp: R knee on 5/3/2024 9:33 AM  Indications: pain  Details: 20 G needle, anterolateral approach  Medications: 20 mg sodium hyaluronate 10 mg/mL(mw 2.4 -3.6 million)  Outcome: tolerated well, no immediate complications  Procedure, treatment alternatives, risks and benefits explained, specific risks discussed. Consent was given by the patient. Immediately prior to procedure a time out was called to verify the correct patient, procedure, equipment, support staff and site/side marked as required. Patient was prepped and draped in the usual sterile fashion.

## 2024-05-09 ENCOUNTER — OFFICE VISIT (OUTPATIENT)
Dept: ORTHOPEDIC SURGERY | Facility: CLINIC | Age: 74
End: 2024-05-09
Payer: MEDICARE

## 2024-05-09 DIAGNOSIS — M17.11 PRIMARY OSTEOARTHRITIS OF RIGHT KNEE: ICD-10-CM

## 2024-05-09 DIAGNOSIS — M23.91 INTERNAL DERANGEMENT OF RIGHT KNEE: ICD-10-CM

## 2024-05-09 DIAGNOSIS — Z96.651 STATUS POST TOTAL RIGHT KNEE REPLACEMENT: Primary | ICD-10-CM

## 2024-05-09 PROCEDURE — 3008F BODY MASS INDEX DOCD: CPT | Performed by: ORTHOPAEDIC SURGERY

## 2024-05-09 PROCEDURE — 1159F MED LIST DOCD IN RCRD: CPT | Performed by: ORTHOPAEDIC SURGERY

## 2024-05-13 PROCEDURE — 20610 DRAIN/INJ JOINT/BURSA W/O US: CPT | Performed by: ORTHOPAEDIC SURGERY

## 2024-05-13 NOTE — PROGRESS NOTES
L Inj/Asp: R knee on 5/13/2024 5:48 AM  Indications: pain  Details: 20 G needle, anterolateral approach  Medications: 20 mg sodium hyaluronate 10 mg/mL(mw 2.4 -3.6 million)  Outcome: tolerated well, no immediate complications  Procedure, treatment alternatives, risks and benefits explained, specific risks discussed. Consent was given by the patient. Immediately prior to procedure a time out was called to verify the correct patient, procedure, equipment, support staff and site/side marked as required. Patient was prepped and draped in the usual sterile fashion.

## 2024-05-17 DIAGNOSIS — E03.8 OTHER SPECIFIED HYPOTHYROIDISM: ICD-10-CM

## 2024-05-17 RX ORDER — LEVOTHYROXINE SODIUM 88 UG/1
TABLET ORAL
Qty: 90 TABLET | Refills: 3 | Status: SHIPPED | OUTPATIENT
Start: 2024-05-17

## 2024-06-05 NOTE — PROGRESS NOTES
Chief Complaint   Patient presents with    Right Knee - Follow-up     OA after cortisone injection 5/9/24     History of Present Illness:  The patient is a 74-year-old female with complaints of right knee pain.  She completed a gel series in her right knee on 05/09/24. She last had a cortisone injection in her right knee on 10/19/24. She states that she got more relief out of the cortisone injections than she did gel. Her left knee is now bothering her and she would like an x-ray of it at some point. She has a history of prediabetes.     Imaging:  Radiographs Knee: No acute fractures or dislocations.  Shows grade 4 medial compartment arthritis in the right knee.     Assessment:   Bilateral knee arthritis flare    Plan:  We reviewed the role of imaging, physical therapy, injections and the time frame to healing and correlation with outcome.  NSAID: Ibuprofen.  GI side effects and medical risks discussed  Ice: 60 minutes on and off  Exercise home program: Medically directed knee therapy / Handout given  Cortisone injection in bilateral knees today.  Follow-up in 4 weeks with updated x-rays of left knee     Patient ID: Emmy Chang is a 74 y.o. female.    L Inj/Asp: bilateral knee on 6/6/2024 11:59 AM  Indications: pain  Details: 22 G needle, anterolateral approach  Medications (Right): 8 mL lidocaine 10 mg/mL (1 %); 2.5 mg triamcinolone acetonide 40 mg/mL  Medications (Left): 8 mL lidocaine 10 mg/mL (1 %); 2.5 mg triamcinolone acetonide 40 mg/mL  Outcome: tolerated well, no immediate complications  Procedure, treatment alternatives, risks and benefits explained, specific risks discussed. Consent was given by the patient. Immediately prior to procedure a time out was called to verify the correct patient, procedure, equipment, support staff and site/side marked as required. Patient was prepped and draped in the usual sterile fashion.            Physical Exam:  Well-nourished, well-developed. No acute distress.  Alert and oriented x3. Responds appropriately to questioning. Good mood. Normal affect.  Physical Exam  Bilateral Knee:  ROM: 110  Pain along medial joint line  Positive Rocky´s test/PositiveApley Grind  Neurovascular exam normal distally  Palpable pedal pulse and good cap refill     Review of Systems:  GENERAL: Negative for malaise, significant weight loss, fever  MUSCULOSKELETAL: See HPI  NEURO:  Negative     Past Medical History:   Diagnosis Date    BCC (basal cell carcinoma)     Endometrial hyperplasia with atypia     History of mammogram 05/11/2023    cat 1       Medication Documentation Review Audit       Reviewed by Melissa Brunner, MA (Medical Assistant) on 05/09/24 at 0838      Medication Order Taking? Sig Documenting Provider Last Dose Status   acetaminophen (Tylenol) 500 mg tablet [id number] No Take 1 tablet (500 mg) by mouth every 4 hours if needed for mild pain (1 - 3). Historical MD Lesvia Taking Active   ammonium lactate (Lac-Hydrin) 12 % lotion [id number] No Apply topically once daily. Historical MD Lesvia Taking Active   aspirin 81 mg EC tablet [id number] No Take 1 tablet (81 mg) by mouth once daily. Historical Provider, MD Taking Active   atenolol (Tenormin) 50 mg tablet [id number] No TAKE 1 TABLET DAILY Fadumo Nixon MD Taking Active   atorvastatin (Lipitor) 80 mg tablet [id number] No TAKE 1 TABLET DAILY Fadumo Nixon MD Taking Active   cholecalciferol (Vitamin D-3) 50 mcg (2,000 unit) capsule [id number] No Take 1 capsule (50 mcg) by mouth. Historical MD Lesvia Taking Active   coenzyme Q-10 100 mg capsule [id number] No Take 1 capsule (100 mg) by mouth once daily. Historical Provider, MD Taking Active   coenzyme Q10-vitamin E 100-5 mg-unit capsule [id number] No Take 1 capsule by mouth. Historical MD Lesvia Taking Active   empagliflozin (Jardiance) 10 mg [id number]  Take 1 tablet (10 mg) by mouth once daily. Fadumo Nixon MD  Active   escitalopram (Lexapro) 20 mg tablet  [id number] No TAKE 1 TABLET DAILY Fadumo Nixon MD Taking Active   gabapentin (Neurontin) 300 mg capsule [id number] No Take 1 capsule (300 mg) by mouth 3 times a day. Historical Provider, MD Taking Active   levothyroxine (Synthroid, Levoxyl) 88 mcg tablet [id number] No TAKE 1 TABLET EVERY MORNING BEFORE BREAKFAST ON AN EMPTY STOMACH Fadumo Nixon MD Taking Active   metFORMIN (Glucophage) 500 mg tablet [id number]  Take 1 tablet (500 mg) by mouth once daily with breakfast. Fadumo Nixon MD  Active   multivitamin-min-iron-FA-vit K (Adults Multivitamin) 18 mg iron-400 mcg-25 mcg tablet [id number] No Take by mouth. Historical Provider, MD Taking Active   triamcinolone (Kenalog) 0.1 % cream [id number] No Apply topically 2 times a day. Historical Provider, MD Taking Active   triamterene-hydrochlorothiazid (Maxzide-25) 37.5-25 mg tablet [id number] No TAKE 1 TABLET DAILY Fadumo Nixon MD Taking Active                    Allergies   Allergen Reactions    Banana Angioedema    Cantaloupe Angioedema     Cantalope    Watermelon Angioedema       Social History     Socioeconomic History    Marital status:      Spouse name: Not on file    Number of children: Not on file    Years of education: Not on file    Highest education level: Not on file   Occupational History    Not on file   Tobacco Use    Smoking status: Never    Smokeless tobacco: Never   Substance and Sexual Activity    Alcohol use: Not Currently    Drug use: Never    Sexual activity: Not on file   Other Topics Concern    Not on file   Social History Narrative    Not on file     Social Determinants of Health     Financial Resource Strain: Not on file   Food Insecurity: Not on file   Transportation Needs: Not on file   Physical Activity: Not on file   Stress: Not on file   Social Connections: Not on file   Intimate Partner Violence: Not on file   Housing Stability: Not on file       Past Surgical History:   Procedure Laterality Date     COLONOSCOPY W/ POLYPECTOMY  10/2020    mucosal fold-due 2025    SKIN SURGERY  2016    MOHS x 5-last 2016    TONSILLECTOMY  1955    TOTAL ABDOMINAL HYSTERECTOMY W/ BILATERAL SALPINGOOPHORECTOMY  01/2022    endometrial CA       No results found.       Scrvinicio Attestation  By signing my name below, Kayy PALMA Scribe   attest that this documentation has been prepared under the direction and in the presence of Carlos Hussein MD.

## 2024-06-06 ENCOUNTER — OFFICE VISIT (OUTPATIENT)
Dept: ORTHOPEDIC SURGERY | Facility: CLINIC | Age: 74
End: 2024-06-06
Payer: MEDICARE

## 2024-06-06 DIAGNOSIS — M17.0 PRIMARY OSTEOARTHRITIS OF BOTH KNEES: Primary | ICD-10-CM

## 2024-06-06 PROCEDURE — 99214 OFFICE O/P EST MOD 30 MIN: CPT | Performed by: ORTHOPAEDIC SURGERY

## 2024-06-06 PROCEDURE — 3008F BODY MASS INDEX DOCD: CPT | Performed by: ORTHOPAEDIC SURGERY

## 2024-06-06 PROCEDURE — 20610 DRAIN/INJ JOINT/BURSA W/O US: CPT | Performed by: ORTHOPAEDIC SURGERY

## 2024-06-06 RX ORDER — TRIAMCINOLONE ACETONIDE 40 MG/ML
2.5 INJECTION, SUSPENSION INTRA-ARTICULAR; INTRAMUSCULAR
Status: COMPLETED | OUTPATIENT
Start: 2024-06-06 | End: 2024-06-06

## 2024-06-06 RX ORDER — LIDOCAINE HYDROCHLORIDE 10 MG/ML
8 INJECTION INFILTRATION; PERINEURAL
Status: COMPLETED | OUTPATIENT
Start: 2024-06-06 | End: 2024-06-06

## 2024-06-06 RX ADMIN — LIDOCAINE HYDROCHLORIDE 8 ML: 10 INJECTION INFILTRATION; PERINEURAL at 11:59

## 2024-06-06 RX ADMIN — TRIAMCINOLONE ACETONIDE 2.5 MG: 40 INJECTION, SUSPENSION INTRA-ARTICULAR; INTRAMUSCULAR at 11:59

## 2024-06-10 PROBLEM — C54.1 ENDOMETRIAL CANCER (MULTI): Status: RESOLVED | Noted: 2024-01-31 | Resolved: 2024-06-10

## 2024-06-21 ENCOUNTER — HOSPITAL ENCOUNTER (OUTPATIENT)
Dept: RADIOLOGY | Facility: HOSPITAL | Age: 74
Discharge: HOME | End: 2024-06-21
Payer: MEDICARE

## 2024-06-21 VITALS — HEIGHT: 68 IN | WEIGHT: 275 LBS | BODY MASS INDEX: 41.68 KG/M2

## 2024-06-21 DIAGNOSIS — Z12.31 ENCOUNTER FOR SCREENING MAMMOGRAM FOR MALIGNANT NEOPLASM OF BREAST: ICD-10-CM

## 2024-06-21 PROCEDURE — 77067 SCR MAMMO BI INCL CAD: CPT

## 2024-06-27 ENCOUNTER — LAB (OUTPATIENT)
Dept: LAB | Facility: LAB | Age: 74
End: 2024-06-27
Payer: MEDICARE

## 2024-06-27 DIAGNOSIS — Z00.00 PHYSICAL EXAM: ICD-10-CM

## 2024-06-27 DIAGNOSIS — F32.A ANXIETY AND DEPRESSION: ICD-10-CM

## 2024-06-27 DIAGNOSIS — F41.9 ANXIETY AND DEPRESSION: ICD-10-CM

## 2024-06-27 DIAGNOSIS — R73.03 PREDIABETES: ICD-10-CM

## 2024-06-27 DIAGNOSIS — Z00.00 ROUTINE GENERAL MEDICAL EXAMINATION AT HEALTH CARE FACILITY: ICD-10-CM

## 2024-06-27 DIAGNOSIS — M25.512 ACUTE PAIN OF LEFT SHOULDER: ICD-10-CM

## 2024-06-27 DIAGNOSIS — N18.30 STAGE 3 CHRONIC KIDNEY DISEASE, UNSPECIFIED WHETHER STAGE 3A OR 3B CKD (MULTI): ICD-10-CM

## 2024-06-27 DIAGNOSIS — E03.8 OTHER SPECIFIED HYPOTHYROIDISM: ICD-10-CM

## 2024-06-27 DIAGNOSIS — C54.1 ENDOMETRIAL CANCER (MULTI): ICD-10-CM

## 2024-06-27 DIAGNOSIS — Z00.00 ENCOUNTER FOR MEDICARE ANNUAL WELLNESS EXAM: ICD-10-CM

## 2024-06-27 DIAGNOSIS — E78.2 MIXED HYPERLIPIDEMIA: ICD-10-CM

## 2024-06-27 DIAGNOSIS — M54.12 CERVICAL RADICULOPATHY: ICD-10-CM

## 2024-06-27 DIAGNOSIS — I10 ESSENTIAL HYPERTENSION: ICD-10-CM

## 2024-06-27 DIAGNOSIS — M19.012 PRIMARY OSTEOARTHRITIS OF LEFT SHOULDER: ICD-10-CM

## 2024-06-27 DIAGNOSIS — R73.9 HYPERGLYCEMIA: ICD-10-CM

## 2024-06-27 DIAGNOSIS — Z12.31 ENCOUNTER FOR SCREENING MAMMOGRAM FOR MALIGNANT NEOPLASM OF BREAST: ICD-10-CM

## 2024-06-27 LAB
ALBUMIN SERPL BCP-MCNC: 4.2 G/DL (ref 3.4–5)
ALP SERPL-CCNC: 134 U/L (ref 33–136)
ALT SERPL W P-5'-P-CCNC: 22 U/L (ref 7–45)
ANION GAP SERPL CALC-SCNC: 10 MMOL/L (ref 10–20)
AST SERPL W P-5'-P-CCNC: 14 U/L (ref 9–39)
BASOPHILS # BLD AUTO: 0.04 X10*3/UL (ref 0–0.1)
BASOPHILS NFR BLD AUTO: 0.5 %
BILIRUB SERPL-MCNC: 0.5 MG/DL (ref 0–1.2)
BUN SERPL-MCNC: 23 MG/DL (ref 6–23)
CALCIUM SERPL-MCNC: 9.9 MG/DL (ref 8.6–10.3)
CHLORIDE SERPL-SCNC: 103 MMOL/L (ref 98–107)
CHOLEST SERPL-MCNC: 188 MG/DL (ref 0–199)
CHOLESTEROL/HDL RATIO: 3.5
CO2 SERPL-SCNC: 30 MMOL/L (ref 21–32)
CREAT SERPL-MCNC: 1.02 MG/DL (ref 0.5–1.05)
EGFRCR SERPLBLD CKD-EPI 2021: 59 ML/MIN/1.73M*2
EOSINOPHIL # BLD AUTO: 0.19 X10*3/UL (ref 0–0.4)
EOSINOPHIL NFR BLD AUTO: 2.3 %
ERYTHROCYTE [DISTWIDTH] IN BLOOD BY AUTOMATED COUNT: 15.4 % (ref 11.5–14.5)
EST. AVERAGE GLUCOSE BLD GHB EST-MCNC: 128 MG/DL
GLUCOSE SERPL-MCNC: 83 MG/DL (ref 74–99)
HBA1C MFR BLD: 6.1 %
HCT VFR BLD AUTO: 40 % (ref 36–46)
HDLC SERPL-MCNC: 54.1 MG/DL
HGB BLD-MCNC: 12.8 G/DL (ref 12–16)
IMM GRANULOCYTES # BLD AUTO: 0.05 X10*3/UL (ref 0–0.5)
IMM GRANULOCYTES NFR BLD AUTO: 0.6 % (ref 0–0.9)
LDLC SERPL CALC-MCNC: 54 MG/DL
LYMPHOCYTES # BLD AUTO: 1.47 X10*3/UL (ref 0.8–3)
LYMPHOCYTES NFR BLD AUTO: 17.5 %
MCH RBC QN AUTO: 28.4 PG (ref 26–34)
MCHC RBC AUTO-ENTMCNC: 32 G/DL (ref 32–36)
MCV RBC AUTO: 89 FL (ref 80–100)
MONOCYTES # BLD AUTO: 0.88 X10*3/UL (ref 0.05–0.8)
MONOCYTES NFR BLD AUTO: 10.5 %
NEUTROPHILS # BLD AUTO: 5.75 X10*3/UL (ref 1.6–5.5)
NEUTROPHILS NFR BLD AUTO: 68.6 %
NON HDL CHOLESTEROL: 134 MG/DL (ref 0–149)
NRBC BLD-RTO: 0 /100 WBCS (ref 0–0)
PLATELET # BLD AUTO: 344 X10*3/UL (ref 150–450)
POTASSIUM SERPL-SCNC: 4.7 MMOL/L (ref 3.5–5.3)
PROT SERPL-MCNC: 6.6 G/DL (ref 6.4–8.2)
RBC # BLD AUTO: 4.5 X10*6/UL (ref 4–5.2)
SODIUM SERPL-SCNC: 138 MMOL/L (ref 136–145)
T4 FREE SERPL-MCNC: 0.84 NG/DL (ref 0.61–1.12)
TRIGL SERPL-MCNC: 398 MG/DL (ref 0–149)
TSH SERPL-ACNC: 2.61 MIU/L (ref 0.44–3.98)
VLDL: 80 MG/DL (ref 0–40)
WBC # BLD AUTO: 8.4 X10*3/UL (ref 4.4–11.3)

## 2024-06-27 PROCEDURE — 36415 COLL VENOUS BLD VENIPUNCTURE: CPT

## 2024-06-27 PROCEDURE — 85025 COMPLETE CBC W/AUTO DIFF WBC: CPT

## 2024-06-27 PROCEDURE — 84443 ASSAY THYROID STIM HORMONE: CPT

## 2024-06-27 PROCEDURE — 83036 HEMOGLOBIN GLYCOSYLATED A1C: CPT

## 2024-06-27 PROCEDURE — 80061 LIPID PANEL: CPT

## 2024-06-27 PROCEDURE — 84439 ASSAY OF FREE THYROXINE: CPT

## 2024-06-27 PROCEDURE — 80053 COMPREHEN METABOLIC PANEL: CPT

## 2024-07-01 ENCOUNTER — APPOINTMENT (OUTPATIENT)
Dept: PRIMARY CARE | Facility: CLINIC | Age: 74
End: 2024-07-01
Payer: MEDICARE

## 2024-07-01 VITALS
BODY MASS INDEX: 42.28 KG/M2 | WEIGHT: 279 LBS | TEMPERATURE: 97 F | HEART RATE: 76 BPM | DIASTOLIC BLOOD PRESSURE: 76 MMHG | SYSTOLIC BLOOD PRESSURE: 128 MMHG | OXYGEN SATURATION: 96 % | HEIGHT: 68 IN | RESPIRATION RATE: 16 BRPM

## 2024-07-01 DIAGNOSIS — E03.8 OTHER SPECIFIED HYPOTHYROIDISM: ICD-10-CM

## 2024-07-01 DIAGNOSIS — N18.30 STAGE 3 CHRONIC KIDNEY DISEASE, UNSPECIFIED WHETHER STAGE 3A OR 3B CKD (MULTI): ICD-10-CM

## 2024-07-01 DIAGNOSIS — I10 ESSENTIAL HYPERTENSION: ICD-10-CM

## 2024-07-01 DIAGNOSIS — R93.1 ABNORMAL SCREENING CARDIAC CT: ICD-10-CM

## 2024-07-01 DIAGNOSIS — E78.2 MIXED HYPERLIPIDEMIA: ICD-10-CM

## 2024-07-01 DIAGNOSIS — R73.03 PREDIABETES: ICD-10-CM

## 2024-07-01 DIAGNOSIS — R73.9 HYPERGLYCEMIA: ICD-10-CM

## 2024-07-01 DIAGNOSIS — F32.A ANXIETY AND DEPRESSION: ICD-10-CM

## 2024-07-01 DIAGNOSIS — F41.9 ANXIETY AND DEPRESSION: ICD-10-CM

## 2024-07-01 PROCEDURE — 1160F RVW MEDS BY RX/DR IN RCRD: CPT | Performed by: FAMILY MEDICINE

## 2024-07-01 PROCEDURE — 3074F SYST BP LT 130 MM HG: CPT | Performed by: FAMILY MEDICINE

## 2024-07-01 PROCEDURE — 3078F DIAST BP <80 MM HG: CPT | Performed by: FAMILY MEDICINE

## 2024-07-01 PROCEDURE — 1159F MED LIST DOCD IN RCRD: CPT | Performed by: FAMILY MEDICINE

## 2024-07-01 PROCEDURE — 3008F BODY MASS INDEX DOCD: CPT | Performed by: FAMILY MEDICINE

## 2024-07-01 PROCEDURE — 99214 OFFICE O/P EST MOD 30 MIN: CPT | Performed by: FAMILY MEDICINE

## 2024-07-01 PROCEDURE — 1036F TOBACCO NON-USER: CPT | Performed by: FAMILY MEDICINE

## 2024-07-01 RX ORDER — METFORMIN HYDROCHLORIDE 500 MG/1
500 TABLET ORAL
Qty: 180 TABLET | Refills: 3 | Status: SHIPPED | OUTPATIENT
Start: 2024-07-01

## 2024-07-01 NOTE — PROGRESS NOTES
Covid vax: UTD  Flu: UTD  Pneumo: UTD  RSV: UTD  Shingles: UTD    CRC: due 2025  Mammogram: 6/2024  Pap: hysterectomy  Lmp: hysterectomy

## 2024-07-01 NOTE — PROGRESS NOTES
Subjective   Patient ID: Emmy Chang is a 74 y.o. female who presents for Prediabetes, Hypertension, Chronic Kidney Disease, Hyperlipidemia, and Hypothyroidism.  Covid vax: UTD  Flu: UTD  Pneumo: UTD  RSV: UTD  Shingles: UTD     CRC: due 2025  Mammogram: 6/2024  Pap: hysterectomy  Lmp: hysterectomy  Ldl 54  Hba1c 6.1  Low but not zero ccs  Up metformin   HPI  Patient Active Problem List   Diagnosis    Adenomatous polyp of descending colon    Anemia    Anxiety and depression    Osteoarthritis    CKD (chronic kidney disease) stage 3, GFR 30-59 ml/min (Multi)    Degenerative cervical disc    Essential hypertension    Hematuria    History of malignant neoplasm of skin    Hyperglycemia    Mixed hyperlipidemia    Other specified hypothyroidism    Polyneuropathy    Prediabetes    Scalp psoriasis    Spinal stenosis of lumbar region    Hx of malignant neoplasm of endometrium    Spondylosis of lumbar spine    Paresthesia of skin    Stage 3 chronic kidney disease (Multi)    Arthropathy    Atypical mole    Bilateral sensorineural hearing loss    Class 3 severe obesity due to excess calories with body mass index (BMI) of 40.0 to 44.9 in adult (Multi)    Lumbar radiculopathy, chronic    Morbid obesity with BMI of 40.0-44.9, adult (Multi)    Tinnitus    Actinic keratitis       Past Surgical History:   Procedure Laterality Date    COLONOSCOPY W/ POLYPECTOMY  10/2020    mucosal fold-due 2025    SKIN SURGERY  2016    MOHS x 5-last 2016    TONSILLECTOMY  1955    TOTAL ABDOMINAL HYSTERECTOMY W/ BILATERAL SALPINGOOPHORECTOMY  01/2022    endometrial CA       Review of Systems  This patient has  NO history of seizures/ CAD or CVA    NO history of recent Covid nor flu symptoms,  NO Fever nor chills,  NO Chest pain, shortness of breath nor paroxysmal nocturnal dyspnea,  NO Nausea, vomiting, nor diarrhea,  NO Hematochezia nor melena,  NO Dysuria, hematuria, nor new incontinence issues  NO new severe headaches nor neurological  "complaints,  NO new issues with anxiety nor depression nor new psychiatric complaints,  NO suicidal nor homicidal ideations.     OBJECTIVE:  /76   Pulse 76   Temp 36.1 °C (97 °F) (Temporal)   Resp 16   Ht 1.727 m (5' 8\")   Wt 127 kg (279 lb)   LMP  (LMP Unknown)   SpO2 96%   BMI 42.42 kg/m²      General:  alert, oriented, no acute distress.  No obvious skin rashes noted.   No gait disturbance noted.    Mood is pleasant,  no signs of emotional distress.   Not appearing intoxicated or altered.   No voiced delusions,   Normal, appropriate behavior.    HEENT: Normocephalic, atraumatic,   Pupils round, reactive to light  Extraocular motions intact and wnl  Tympanic membranes normal    Neck: no nuchal rigidity  No masses palpable.  No carotid bruits.  No thyromegaly.    Respiratory: Equal breath sounds  No wheezes,    rales,    nor rhonchi  No respiratory distress.    Heart: Regular rate and rhythm, no    murmurs  no rubs/gallops    Abdomen: no masses palpable, nontender, no rebound nor guarding.    Extremities: NO cyanosis noted, no clubbing.   No edema noted.  2+dorsalis pedis pulses.    Normal-not antalgic, steady gait.    Lab on 06/27/2024   Component Date Value Ref Range Status    Glucose 06/27/2024 83  74 - 99 mg/dL Final    Sodium 06/27/2024 138  136 - 145 mmol/L Final    Potassium 06/27/2024 4.7  3.5 - 5.3 mmol/L Final    Chloride 06/27/2024 103  98 - 107 mmol/L Final    Bicarbonate 06/27/2024 30  21 - 32 mmol/L Final    Anion Gap 06/27/2024 10  10 - 20 mmol/L Final    Urea Nitrogen 06/27/2024 23  6 - 23 mg/dL Final    Creatinine 06/27/2024 1.02  0.50 - 1.05 mg/dL Final    eGFR 06/27/2024 59 (L)  >60 mL/min/1.73m*2 Final    Calculations of estimated GFR are performed using the 2021 CKD-EPI Study Refit equation without the race variable for the IDMS-Traceable creatinine methods.  https://jasn.asnjournals.org/content/early/2021/09/22/ASN.7171579068    Calcium 06/27/2024 9.9  8.6 - 10.3 mg/dL Final    " Albumin 06/27/2024 4.2  3.4 - 5.0 g/dL Final    Alkaline Phosphatase 06/27/2024 134  33 - 136 U/L Final    Total Protein 06/27/2024 6.6  6.4 - 8.2 g/dL Final    AST 06/27/2024 14  9 - 39 U/L Final    Bilirubin, Total 06/27/2024 0.5  0.0 - 1.2 mg/dL Final    ALT 06/27/2024 22  7 - 45 U/L Final    Patients treated with Sulfasalazine may generate falsely decreased results for ALT.    WBC 06/27/2024 8.4  4.4 - 11.3 x10*3/uL Final    nRBC 06/27/2024 0.0  0.0 - 0.0 /100 WBCs Final    RBC 06/27/2024 4.50  4.00 - 5.20 x10*6/uL Final    Hemoglobin 06/27/2024 12.8  12.0 - 16.0 g/dL Final    Hematocrit 06/27/2024 40.0  36.0 - 46.0 % Final    MCV 06/27/2024 89  80 - 100 fL Final    MCH 06/27/2024 28.4  26.0 - 34.0 pg Final    MCHC 06/27/2024 32.0  32.0 - 36.0 g/dL Final    RDW 06/27/2024 15.4 (H)  11.5 - 14.5 % Final    Platelets 06/27/2024 344  150 - 450 x10*3/uL Final    Neutrophils % 06/27/2024 68.6  40.0 - 80.0 % Final    Immature Granulocytes %, Automated 06/27/2024 0.6  0.0 - 0.9 % Final    Immature Granulocyte Count (IG) includes promyelocytes, myelocytes and metamyelocytes but does not include bands. Percent differential counts (%) should be interpreted in the context of the absolute cell counts (cells/UL).    Lymphocytes % 06/27/2024 17.5  13.0 - 44.0 % Final    Monocytes % 06/27/2024 10.5  2.0 - 10.0 % Final    Eosinophils % 06/27/2024 2.3  0.0 - 6.0 % Final    Basophils % 06/27/2024 0.5  0.0 - 2.0 % Final    Neutrophils Absolute 06/27/2024 5.75 (H)  1.60 - 5.50 x10*3/uL Final    Percent differential counts (%) should be interpreted in the context of the absolute cell counts (cells/uL).    Immature Granulocytes Absolute, Au* 06/27/2024 0.05  0.00 - 0.50 x10*3/uL Final    Lymphocytes Absolute 06/27/2024 1.47  0.80 - 3.00 x10*3/uL Final    Monocytes Absolute 06/27/2024 0.88 (H)  0.05 - 0.80 x10*3/uL Final    Eosinophils Absolute 06/27/2024 0.19  0.00 - 0.40 x10*3/uL Final    Basophils Absolute 06/27/2024 0.04  0.00 -  0.10 x10*3/uL Final    Hemoglobin A1C 06/27/2024 6.1 (H)  see below % Final    Estimated Average Glucose 06/27/2024 128  Not Established mg/dL Final    Cholesterol 06/27/2024 188  0 - 199 mg/dL Final          Age      Desirable   Borderline High   High     0-19 Y     0 - 169       170 - 199     >/= 200    20-24 Y     0 - 189       190 - 224     >/= 225         >24 Y     0 - 199       200 - 239     >/= 240   **All ranges are based on fasting samples. Specific   therapeutic targets will vary based on patient-specific   cardiac risk.    Pediatric guidelines reference:Pediatrics 2011, 128(S5).Adult guidelines reference: NCEP ATPIII Guidelines,TL 2001, 258:5536-57    Venipuncture immediately after or during the administration of Metamizole may lead to falsely low results. Testing should be performed immediately prior to Metamizole dosing.    HDL-Cholesterol 06/27/2024 54.1  mg/dL Final      Age       Very Low   Low     Normal    High    0-19 Y    < 35      < 40     40-45     ----  20-24 Y    ----     < 40      >45      ----        >24 Y      ----     < 40     40-60      >60      Cholesterol/HDL Ratio 06/27/2024 3.5   Final      Ref Values  Desirable  < 3.4  High Risk  > 5.0    LDL Calculated 06/27/2024 54  <=99 mg/dL Final                                Near   Borderline      AGE      Desirable  Optimal    High     High     Very High     0-19 Y     0 - 109     ---    110-129   >/= 130     ----    20-24 Y     0 - 119     ---    120-159   >/= 160     ----      >24 Y     0 -  99   100-129  130-159   160-189     >/=190      VLDL 06/27/2024 80 (H)  0 - 40 mg/dL Final    Triglycerides 06/27/2024 398 (H)  0 - 149 mg/dL Final       Age         Desirable   Borderline High   High     Very High   0 D-90 D    19 - 174         ----         ----        ----  91 D- 9 Y     0 -  74        75 -  99     >/= 100      ----    10-19 Y     0 -  89        90 - 129     >/= 130      ----    20-24 Y     0 - 114       115 - 149     >/= 150       ----         >24 Y     0 - 149       150 - 199    200- 499    >/= 500    Venipuncture immediately after or during the administration of Metamizole may lead to falsely low results. Testing should be performed immediately prior to Metamizole dosing.    Non HDL Cholesterol 06/27/2024 134  0 - 149 mg/dL Final          Age       Desirable   Borderline High   High     Very High     0-19 Y     0 - 119       120 - 144     >/= 145    >/= 160    20-24 Y     0 - 149       150 - 189     >/= 190      ----         >24 Y    30 mg/dL above LDL Cholesterol goal      Thyroid Stimulating Hormone 06/27/2024 2.61  0.44 - 3.98 mIU/L Final    Thyroxine, Free 06/27/2024 0.84  0.61 - 1.12 ng/dL Final   Lab on 04/30/2024   Component Date Value Ref Range Status    Color, Urine 04/30/2024 Yellow  Straw, Yellow Final    Appearance, Urine 04/30/2024 Clear  Clear Final    Specific Gravity, Urine 04/30/2024 1.023  1.005 - 1.035 Final    pH, Urine 04/30/2024 5.0  5.0, 5.5, 6.0, 6.5, 7.0, 7.5, 8.0 Final    Protein, Urine 04/30/2024 NEGATIVE  NEGATIVE mg/dL Final    Glucose, Urine 04/30/2024 >=500 (3+) (A)  NEGATIVE mg/dL Final    Blood, Urine 04/30/2024 NEGATIVE  NEGATIVE Final    Ketones, Urine 04/30/2024 NEGATIVE  NEGATIVE mg/dL Final    Bilirubin, Urine 04/30/2024 NEGATIVE  NEGATIVE Final    Urobilinogen, Urine 04/30/2024 <2.0  <2.0 mg/dL Final    Nitrite, Urine 04/30/2024 NEGATIVE  NEGATIVE Final    Leukocyte Esterase, Urine 04/30/2024 MODERATE (2+) (A)  NEGATIVE Final    Parathyroid Hormone, Intact 04/30/2024 51.5  18.5 - 88.0 pg/mL Final    Vitamin D, 25-Hydroxy, Total 04/30/2024 35  30 - 100 ng/mL Final    Glucose 04/30/2024 96  74 - 99 mg/dL Final    Sodium 04/30/2024 138  136 - 145 mmol/L Final    Potassium 04/30/2024 4.7  3.5 - 5.3 mmol/L Final    Chloride 04/30/2024 102  98 - 107 mmol/L Final    Bicarbonate 04/30/2024 28  21 - 32 mmol/L Final    Anion Gap 04/30/2024 13  10 - 20 mmol/L Final    Urea Nitrogen 04/30/2024 21  6 - 23 mg/dL  Final    Creatinine 04/30/2024 1.15 (H)  0.50 - 1.05 mg/dL Final    eGFR 04/30/2024 51 (L)  >60 mL/min/1.73m*2 Final    Calculations of estimated GFR are performed using the 2021 CKD-EPI Study Refit equation without the race variable for the IDMS-Traceable creatinine methods.  https://jasn.asnjournals.org/content/early/2021/09/22/ASN.8284062284    Calcium 04/30/2024 9.7  8.6 - 10.3 mg/dL Final    Phosphorus 04/30/2024 3.5  2.5 - 4.9 mg/dL Final    The performance characteristics of phosphorus testing in heparinized plasma have been validated by the individual  laboratory site where testing is performed. Testing on heparinized plasma is not approved by the FDA; however, such approval is not necessary.    Albumin 04/30/2024 4.3  3.4 - 5.0 g/dL Final    Albumin, Urine Random 04/30/2024 17.8  Not established mg/L Final    Creatinine, Urine Random 04/30/2024 73.1  20.0 - 320.0 mg/dL Final    Albumin/Creatinine Ratio 04/30/2024 24.4  <30.0 ug/mg Creat Final    WBC, Urine 04/30/2024 11-20 (A)  1-5, NONE /HPF Final    RBC, Urine 04/30/2024 1-2  NONE, 1-2, 3-5 /HPF Final    Squamous Epithelial Cells, Urine 04/30/2024 1-9 (SPARSE)  Reference range not established. /HPF Final    Bacteria, Urine 04/30/2024 1+ (A)  NONE SEEN /HPF Final    Mucus, Urine 04/30/2024 1+  Reference range not established. /LPF Final        Assessment/Plan     Problem List Items Addressed This Visit       Anxiety and depression    CKD (chronic kidney disease) stage 3, GFR 30-59 ml/min (Multi)    Essential hypertension    Hyperglycemia    Mixed hyperlipidemia    Other specified hypothyroidism    Prediabetes       Follow up at next scheduled visit -as planned    Add mounjaro-zepbound This medications risks, benefits, and alternatives were discussed with patient at length.  If any unwanted side effects occur-discontinue medicine and call the office for discussion.    Up glucophage  This medications risks, benefits, and alternatives were discussed with  patient at length.  If any unwanted side effects occur-discontinue medicine and call the office for discussion.  Sees NEPHRO  Mood is good  Scope due next yr  To cardio-wanted to discuss ccs first  See me 6mo or so  Labs then

## 2024-07-02 ENCOUNTER — TELEPHONE (OUTPATIENT)
Dept: PHARMACY | Facility: HOSPITAL | Age: 74
End: 2024-07-02
Payer: MEDICARE

## 2024-07-02 NOTE — TELEPHONE ENCOUNTER
Spoke with patient today regarding Zepbound/Mounjaro. Patient's insurance will not cover weight medications, nor GLP1as unless patient is a Type II diabetic with A1c > 6.5%. Advised patient has the option to pay out of pocket, however out of pocket expense is quite costly. Other alternative for medication in same class is generic semaglutide compounded at [facility] pharmacy, however $200-$250/month is still expensive.     Patient demonstrated understanding and was provided with [facility] phone number if patient wishes to pay out of pocket.     Thank you,  Hermelinda Thacker, PharmD

## 2024-07-05 DIAGNOSIS — I10 ESSENTIAL HYPERTENSION: ICD-10-CM

## 2024-07-05 RX ORDER — ATENOLOL 50 MG/1
TABLET ORAL
Qty: 90 TABLET | Refills: 3 | Status: SHIPPED | OUTPATIENT
Start: 2024-07-05

## 2024-07-11 ENCOUNTER — APPOINTMENT (OUTPATIENT)
Dept: ORTHOPEDIC SURGERY | Facility: CLINIC | Age: 74
End: 2024-07-11
Payer: MEDICARE

## 2024-08-12 DIAGNOSIS — F41.9 ANXIETY AND DEPRESSION: ICD-10-CM

## 2024-08-12 DIAGNOSIS — F32.A ANXIETY AND DEPRESSION: ICD-10-CM

## 2024-08-12 RX ORDER — ESCITALOPRAM OXALATE 20 MG/1
20 TABLET ORAL DAILY
Qty: 90 TABLET | Refills: 3 | Status: SHIPPED | OUTPATIENT
Start: 2024-08-12

## 2024-08-21 ENCOUNTER — APPOINTMENT (OUTPATIENT)
Dept: CARDIOLOGY | Facility: CLINIC | Age: 74
End: 2024-08-21
Payer: MEDICARE

## 2024-08-23 NOTE — PROGRESS NOTES
Patient ID: Emmy Chang is a 74 y.o. female.  Primary Care Provider: Fadumo Nixon MD    Subjective    Emmy Chang  Referral from Dr. Stacy Haque  PCP:  Fadumo Nixon  72 yo with Stage 1B, Grade 1 endometrial cancer sp TVH BS on 1/19/22. MMR proficient.  Tumor History:   10/15/21 hysteroscopy/polypectomy  demonstrating endometrial hyperplasia with focal atypia, involving endometrial polyp   10/4/21 Pelvic ultrasound 1.1 cm endometrial thickness, normal sized uterus, no adnexal masses.  1/19/22 TVH, BS - final path. FIGO grade 1, endometrioid carcinoma, 55% myometrial invasion, no LVSI, MMR proficient  PMH:    Obesity with BMI 45  CKD stage G31/A2   Anxiety/depression, Rx Celexa  Mixed hyperlipidemia  Hypothyroidism  PSH:   Colonoscopy 2020  MOHS surgery  FH:  Colon cancer      Objective    Visit Vitals  /83 (BP Location: Right arm, Patient Position: Sitting, BP Cuff Size: Adult)   Pulse 66   Temp 36 °C (96.8 °F) (Temporal)   Resp 16       Interval history: Patient is a 74 year old female now status post vaginal hysterectomy/BSO on 1/19/22 with final pathology revealing FIGO grade 1, stage 1B endometrioid carcinoma, 55% myoinvasion without LVSI. MMR intact. Here for 6 month follow up.  Pt has been doing well since last office visit. Denies vaginal bleeding and abnormal discharge. Has urinary incontinence, wears a pad frequently, experiences skin irritation but tries to take breaks when she can. Denies diarrhea and constipation. Not sexually active. Up to date on mammograms. Has more swelling than usual in the left lower extremity, denies pain but has neuropathy. Pt has a radiculopathy in her back that is likely the cause of her neuropathy. Sees pain management, used to take gabapentin but is now doing TENS therapy which helps with the pain.      Physical Exam:    Constitutional: Doing well. MANUEL  Eyes: PERRL  ENMT: Moist mucus membranes  Head/Neck: Supple. Symmetrical  Cardiovascular:  Regular, rate and rhythm. 2+ equal pulses of the extremities  Respiratory/Thorax: CTA. RRR. Chest rise symmetrical.  Gastrointestinal: Non-distended, soft, non-tender  Genitourinary:   Normal external female genitalia. No vulvar lesions noted, erythema noted.   Speculum exam: Smooth vaginal walls without lesions or masses. Vaginal cuff visualized without lesions. Surgically absent cervix.  Bimanual exam: Smooth vaginal wall without lesions or masses.  Surgically absent uterus, cervix, and adnexa.  Rectovaginal exam: smooth rectovaginal septum without lesions or masses  Musculoskeletal: ROM intact, no joint swelling, normal strength  Extremities: No edema  Neurological: Alert and oriented x 3. Pleasant and cooperative.  Lymphatic: No lymphadenopathy. No lymphedema  Psychological: Appropriate mood and behavior  Skin: Warm and dry, no lesions, no rashes    A complete review of systems was performed and all systems were normal except what is noted in the interval history.          Assessment/Plan  Patient is a 74 year old female now status post vaginal hysterectomy/BSO on 1/19/22 with final pathology revealing FIGO grade 1, stage 1B endometrioid carcinoma, 55% myoinvasion without LVSI. MMR intact.  MANUEL 2.5 years.     PLAN:  Follow-up in 6 months or as needed.  Physical examination was within normal limits today.  She is currently MANUEL.  We reviewed signs and symptoms of possible recurrence with the patient and she will call our office should she experience any of these.      I was present with the APRN student who participated in the documentation of this note.  I have personally seen and re-examined the patient and performed the medical decision-making components (assessment and plan of care). I have reviewed the APRN student documentation and verified the findings in the note as written with additions or exceptions as stated in the body of this note.   ARTI Clark-S2

## 2024-08-27 ENCOUNTER — OFFICE VISIT (OUTPATIENT)
Dept: GYNECOLOGIC ONCOLOGY | Facility: CLINIC | Age: 74
End: 2024-08-27
Payer: MEDICARE

## 2024-08-27 VITALS
RESPIRATION RATE: 16 BRPM | DIASTOLIC BLOOD PRESSURE: 83 MMHG | BODY MASS INDEX: 43.54 KG/M2 | HEART RATE: 66 BPM | WEIGHT: 286.38 LBS | SYSTOLIC BLOOD PRESSURE: 134 MMHG | OXYGEN SATURATION: 95 % | TEMPERATURE: 96.8 F

## 2024-08-27 DIAGNOSIS — C54.1 ENDOMETRIAL CANCER (MULTI): Primary | ICD-10-CM

## 2024-08-27 PROCEDURE — 99213 OFFICE O/P EST LOW 20 MIN: CPT | Performed by: NURSE PRACTITIONER

## 2024-08-27 PROCEDURE — 1159F MED LIST DOCD IN RCRD: CPT | Performed by: NURSE PRACTITIONER

## 2024-08-27 PROCEDURE — 3075F SYST BP GE 130 - 139MM HG: CPT | Performed by: NURSE PRACTITIONER

## 2024-08-27 PROCEDURE — 3079F DIAST BP 80-89 MM HG: CPT | Performed by: NURSE PRACTITIONER

## 2024-08-27 PROCEDURE — 1036F TOBACCO NON-USER: CPT | Performed by: NURSE PRACTITIONER

## 2024-08-27 PROCEDURE — 1126F AMNT PAIN NOTED NONE PRSNT: CPT | Performed by: NURSE PRACTITIONER

## 2024-08-27 ASSESSMENT — PAIN SCALES - GENERAL: PAINLEVEL: 0-NO PAIN

## 2024-08-27 NOTE — PROGRESS NOTES
Patient ID: Emmy Chang is a 74 y.o. female.  Primary Care Provider: Fadumo Nixon MD    Subjective    Emmy Chang  Referral from Dr. Stacy Haque  PCP:  Fadumo Nixon  72 yo with Stage 1B, Grade 1 endometrial cancer sp TVH BS on 1/19/22. MMR proficient.  Tumor History:   10/15/21 hysteroscopy/polypectomy  demonstrating endometrial hyperplasia with focal atypia, involving endometrial polyp   10/4/21 Pelvic ultrasound 1.1 cm endometrial thickness, normal sized uterus, no adnexal masses.  1/19/22 TVH, BS - final path. FIGO grade 1, endometrioid carcinoma, 55% myometrial invasion, no LVSI, MMR proficient  PMH:    Obesity with BMI 45  CKD stage G31/A2   Anxiety/depression, Rx Celexa  Mixed hyperlipidemia  Hypothyroidism  PSH:   Colonoscopy 2020  MOHS surgery  FH:  Colon cancer      Objective    Visit Vitals  /83 (BP Location: Right arm, Patient Position: Sitting, BP Cuff Size: Adult)   Pulse 66   Temp 36 °C (96.8 °F) (Temporal)   Resp 16            Interval history: Patient is a 74 year old female now status post vaginal hysterectomy/BSO on 1/19/22 with final pathology revealing FIGO grade 1, stage 1B endometrioid carcinoma, 55% myoinvasion without LVSI. MMR intact. Here for 6 month follow up.       Pt has been doing well since last office visit. Denies vaginal bleeding and abnormal discharge. Has urinary incontinence, wears a pad frequently, experiences skin irritation but tries to take breaks when she can. Discussed taking breaks at home. Denies diarrhea and constipation. Not sexually active. Up to date on mammograms. Has more swelling than usual in the left lower extremity, denies pain but has neuropathy. Pt has a radiculopathy in her back that is likely the cause of her neuropathy. Sees pain management, used to take gabapentin but is now doing TENS therapy which helps with the pain.    Physical Exam:    Constitutional: Doing well. MANUEL  Eyes: PERRL  ENMT: Moist mucus membranes  Head/Neck:  Supple. Symmetrical  Cardiovascular: Regular, rate and rhythm. 2+ equal pulses of the extremities  Respiratory/Thorax: CTA. RRR. Chest rise symmetrical.  Gastrointestinal: Non-distended, soft, non-tender  Genitourinary:   Normal external female genitalia. No vulvar lesions noted, erythema noted.   Speculum exam: Smooth vaginal walls without lesions or masses. Vaginal cuff visualized without lesions. Surgically absent cervix.  Bimanual exam: Smooth vaginal wall without lesions or masses.  Surgically absent uterus, cervix, and adnexa.  Rectovaginal exam: smooth rectovaginal septum without lesions or masses  Musculoskeletal: ROM intact, no joint swelling, normal strength  Extremities: Lower extremity edema  Neurological: Alert and oriented x 3. Pleasant and cooperative.  Lymphatic: No lymphadenopathy. No lymphedema  Psychological: Appropriate mood and behavior  Skin: Warm and dry, no lesions, no rashes    A complete review of systems was performed and all systems were normal except what is noted in the interval history.          Assessment/Plan  Patient is a 74 year old female now status post vaginal hysterectomy/BSO on 1/19/22 with final pathology revealing FIGO grade 1, stage 1B endometrioid carcinoma, 55% myoinvasion without LVSI. MMR intact.  MANUEL 2.5 years.    PLAN:  Follow-up in 6 months or as needed.  Physical examination was within normal limits today.  She is currently MANUEL.  We reviewed signs and symptoms of possible recurrence with the patient and she will call our office should she experience any of these.     I was present with the APRN student who participated in the documentation of this note.  I have personally seen and re-examined the patient and performed the medical decision-making components (assessment and plan of care). I have reviewed the APRN student documentation and verified the findings in the note as written with additions or exceptions as stated in the body of this note.   Rosie Bautista,  PA-S2

## 2024-08-28 ENCOUNTER — APPOINTMENT (OUTPATIENT)
Dept: CARDIOLOGY | Facility: CLINIC | Age: 74
End: 2024-08-28
Payer: MEDICARE

## 2024-08-28 VITALS
BODY MASS INDEX: 43.19 KG/M2 | DIASTOLIC BLOOD PRESSURE: 70 MMHG | HEART RATE: 75 BPM | WEIGHT: 285 LBS | HEIGHT: 68 IN | SYSTOLIC BLOOD PRESSURE: 122 MMHG

## 2024-08-28 DIAGNOSIS — E66.01 MORBID OBESITY WITH BMI OF 40.0-44.9, ADULT (MULTI): ICD-10-CM

## 2024-08-28 DIAGNOSIS — I10 ESSENTIAL HYPERTENSION: ICD-10-CM

## 2024-08-28 DIAGNOSIS — R93.1 AGATSTON CORONARY ARTERY CALCIUM SCORE LESS THAN 100: ICD-10-CM

## 2024-08-28 DIAGNOSIS — Z71.2 ENCOUNTER TO DISCUSS TEST RESULTS: ICD-10-CM

## 2024-08-28 DIAGNOSIS — R93.1 ABNORMAL SCREENING CARDIAC CT: ICD-10-CM

## 2024-08-28 DIAGNOSIS — R73.03 PREDIABETES: ICD-10-CM

## 2024-08-28 DIAGNOSIS — E78.2 MIXED HYPERLIPIDEMIA: ICD-10-CM

## 2024-08-28 PROCEDURE — 3078F DIAST BP <80 MM HG: CPT | Performed by: INTERNAL MEDICINE

## 2024-08-28 PROCEDURE — 1036F TOBACCO NON-USER: CPT | Performed by: INTERNAL MEDICINE

## 2024-08-28 PROCEDURE — 3074F SYST BP LT 130 MM HG: CPT | Performed by: INTERNAL MEDICINE

## 2024-08-28 PROCEDURE — 93000 ELECTROCARDIOGRAM COMPLETE: CPT | Performed by: INTERNAL MEDICINE

## 2024-08-28 PROCEDURE — 1159F MED LIST DOCD IN RCRD: CPT | Performed by: INTERNAL MEDICINE

## 2024-08-28 PROCEDURE — 1160F RVW MEDS BY RX/DR IN RCRD: CPT | Performed by: INTERNAL MEDICINE

## 2024-08-28 PROCEDURE — 3008F BODY MASS INDEX DOCD: CPT | Performed by: INTERNAL MEDICINE

## 2024-08-28 PROCEDURE — 99203 OFFICE O/P NEW LOW 30 MIN: CPT | Performed by: INTERNAL MEDICINE

## 2024-08-28 NOTE — PATIENT INSTRUCTIONS
Continue same medications and treatments.     Please bring any lab results from other providers / physicians to your next appointment.     Please bring all medicines, vitamins, and herbal supplements with you when you come to the office.     Prescriptions will not be filled unless you are compliant with your follow up appointments or have a follow up appointment scheduled as per instruction of your physician. Refills should be requested at the time of your visit.      Scribe Attestation  By signing my name below, Shelby PALMA Scribe   attest that this documentation has been prepared under the direction and in the presence of Marine Faria MD.

## 2024-08-28 NOTE — PROGRESS NOTES
Referred by Dr. Nixon for New Patient Visit (Cardiac scoring results)     History Of Present Illness:    Emmy Chang is a 74 y.o. female presenting with prediabetes, hypertension, mixed hyperlipidemia, chronic kidney disease, hypothyroidism, history of hysterectomy, presents for evaluation from cardiac perspective, coronary calcium score was done recently..  Coronary calcium score January 2024-left main 0 LAD 94 left circumflex 0 RCA 0, 69th percentile for age gender and race .    Does not report any chest pressure tightness heaviness palpitations lightheadedness orthopnea or PND.  Does tend to get lower extremity edema.  Remains on low-dose diuretics.  Has history of hypothyroidism  No recent weight loss or weight gain.  Review of systems is negative for chest discomfort pressure tightness heaviness palpitations lightheadedness orthopnea paroxysmal nocturnal dyspnea dependent edema or claudication TIA or CVA type symptoms or bleeding diathesis  No fever chills or cough.      12 point review of systems is negative or noncontributory except as noted  Past Medical History:  She has a past medical history of BCC (basal cell carcinoma), Endometrial cancer (Multi) (01/31/2024), Endometrial hyperplasia with atypia, and History of mammogram (06/21/2024).    Past Surgical History:  She has a past surgical history that includes Colonoscopy w/ polypectomy (10/2020); Total abdominal hysterectomy w/ bilateral salpingoophorectomy (01/2022); Skin surgery (2016); and Tonsillectomy (1955).      Social History:  She reports that she has never smoked. She has never used smokeless tobacco. She reports that she does not currently use alcohol. She reports that she does not use drugs.    Family History:  Family History   Problem Relation Name Age of Onset    Parkinsonism Mother      Diabetes Father      Leukemia Father      Cancer Brother          colon        Allergies:  Banana, Cantaloupe, and Watermelon    Outpatient  "Medications:  Current Outpatient Medications   Medication Instructions    acetaminophen (TYLENOL) 500 mg, oral, Every 4 hours PRN    ammonium lactate (Lac-Hydrin) 12 % lotion Topical, Daily    aspirin 81 mg EC tablet 1 tablet, oral, Daily    atenolol (Tenormin) 50 mg tablet TAKE 1 TABLET DAILY    atorvastatin (LIPITOR) 80 mg, oral, Daily    cholecalciferol (VITAMIN D-3) 2,000 Units, oral    coenzyme Q-10 100 mg, oral, Daily    coenzyme Q10-vitamin E 100-5 mg-unit capsule 1 capsule, oral    empagliflozin (JARDIANCE) 10 mg, oral, Daily    escitalopram (LEXAPRO) 20 mg, oral, Daily    gabapentin (NEURONTIN) 300 mg, oral, 3 times daily    levothyroxine (Synthroid, Levoxyl) 88 mcg tablet TAKE 1 TABLET EVERY MORNING BEFORE BREAKFAST ON AN EMPTY STOMACH    metFORMIN (GLUCOPHAGE) 500 mg, oral, 2 times daily (morning and late afternoon)    multivitamin-min-iron-FA-vit K (Adults Multivitamin) 18 mg iron-400 mcg-25 mcg tablet oral    tirzepatide (weight loss) (ZEPBOUND) 2.5 mg, subcutaneous, Every 7 days    triamcinolone (Kenalog) 0.1 % cream Topical, 2 times daily    triamterene-hydrochlorothiazid (Maxzide-25) 37.5-25 mg tablet 1 tablet, oral, Daily        Last Recorded Vitals:  Vitals:    08/28/24 0919   BP: 122/70   BP Location: Left arm   Patient Position: Sitting   Pulse: 75   Weight: 129 kg (285 lb)   Height: 1.727 m (5' 8\")       Physical Exam:    GENERAL APPEARANCE: Well developed, well nourished, in no acute distress.  CHEST: Symmetric and non-tender.  INTEGUMENT: Skin warm and dry, without gross excoriationis or lesions.  HEENT: No gross abnormalities, no jugular venous distention no carotid bruit or scleral icterus  NECK: Supple, no JVD, no bruit. Thyroid not palpable. Carotid upstrokes normal.  NEURO/PSHCY: Alert and oriented x3; appropriate behavior and responses and responses, with normal balance and coordination  LUNGS: Clear to auscultation bilaterally; normal respiratory effort.  HEART: Rate and rhythm regular " with no evident murmur; no gallop appreciated. There are no rubs, clicks or heaves.   ABDOMEN: Soft, nontender, no masses  or bruits.   MUSCULOSKELETAL: No obvious deformity identified  EXTREMITIES: Warm  , 1+ pitting edema predominantly dorsum of feet.  PERIPHERAL VASCULAR: Pulses present and equally palpable; 2+ throughout.          Labs reviewed today : Reviewed comprehensive profile lipid profile and hemoglobin A1c basic metabolic profile from June 2024.    Assessment/Plan       1. Abnormal screening cardiac CT  Referral to Cardiology      2. Encounter to discuss test results        3. Agatston coronary artery calcium score less than 100        4. Mixed hyperlipidemia        5. Essential hypertension        6. Prediabetes        7. Morbid obesity with BMI of 40.0-44.9, adult (Multi)           Clinical decision making:  Agree with ongoing aggressive risk factor modification.  In view of her lipid profile it is characterized by elevated triglycerides and low LDL, on max dose atorvastatin, I would consider down titrating her atorvastatin to 40 mg daily and adding a fenofibrate such as Tricor 145 mg daily.  We talked about the dynamic nature of coronary artery disease and the importance of seeking prompt medical attention if new symptoms develop.  At this point patient does not need further cardiac testing.  I will see her on an as-needed basis  Thank you Dr. Nixon for allowing me to participate in Emmy's care, these do not hesitate to call if further questions arise,  Sincerely,  Marine Faria MD [facility]    Provider Attestation - Scribe documentation    All medical record entries made by the Scribe were at my direction and personally dictated by me. I have reviewed the chart and agree that the record accurately reflects my personal performance of the history, physical exam, discussion and plan.

## 2024-09-25 NOTE — PROGRESS NOTES
Chief Complaint   Patient presents with    Left Knee - Follow-up     S/P cortisone injection 6/6/2024    Right Knee - Follow-up     S/P cortisone injection 6/6/2024     History of Present Illness:  Bilateral knee cortisone injections on 06/06/24 provided relief for a little over 3 months. She would like repeat injections today. She is going to [place] December 28th to visit her grandkids.     She completed a gel series in her right knee on 05/09/24. She last had a cortisone injection in her right knee on 10/19/24. She states that she got more relief out of the cortisone injections than she did gel. Her left knee is now bothering her and she would like an x-ray of it at some point. She has a history of prediabetes.     Imaging:  Radiographs Knee: No acute fractures or dislocations.  Shows grade 4 medial compartment arthritis in the right knee.     Assessment:   Bilateral knee arthritis flare    Plan:  We reviewed the role of imaging, physical therapy, injections and the time frame to healing and correlation with outcome.  Continue ice, ibuprofen, and home exercise.   Left knee brace  Cortisone injection in bilateral knees today.  Follow-up in December before her trip to [place].    L Inj/Asp: bilateral knee on 9/26/2024 9:03 AM  Indications: pain  Details: 22 G needle, anterolateral approach  Medications (Right): 8 mL lidocaine 10 mg/mL (1 %); 40 mg triamcinolone acetonide 40 mg/mL  Medications (Left): 8 mL lidocaine 10 mg/mL (1 %); 40 mg triamcinolone acetonide 40 mg/mL  Outcome: tolerated well, no immediate complications  Procedure, treatment alternatives, risks and benefits explained, specific risks discussed. Consent was given by the patient. Immediately prior to procedure a time out was called to verify the correct patient, procedure, equipment, support staff and site/side marked as required. Patient was prepped and draped in the usual sterile fashion.            Physical Exam:  Well-nourished, well-developed.  No acute distress. Alert and oriented x3. Responds appropriately to questioning. Good mood. Normal affect.  Physical Exam  Bilateral Knee:  ROM: 110  Pain along medial joint line  Positive Rocky´s test/PositiveApley Grind  Neurovascular exam normal distally  Palpable pedal pulse and good cap refill     Review of Systems:  GENERAL: Negative for malaise, significant weight loss, fever  MUSCULOSKELETAL: See HPI  NEURO:  Negative     Past Medical History:   Diagnosis Date    BCC (basal cell carcinoma)     Endometrial cancer (Multi) 2024    Endometrial hyperplasia with atypia     History of mammogram 2024    cat 1       Medication Documentation Review Audit       Reviewed by Gilda Castillo MA (Medical Assistant) on 24 at 0837      Medication Order Taking? Sig Documenting Provider Last Dose Status   acetaminophen (Tylenol) 500 mg tablet [id number] No Take 1 tablet (500 mg) by mouth every 4 hours if needed for mild pain (1 - 3). Historical MD Lesvia Taking Active   ammonium lactate (Lac-Hydrin) 12 % lotion [id number] No Apply topically once daily. Historical Provider, MD Taking Active   aspirin 81 mg EC tablet [id number] No Take 1 tablet (81 mg) by mouth once daily. Historical Provider, MD Taking Active   atenolol (Tenormin) 50 mg tablet [id number] No TAKE 1 TABLET DAILY Fadumo Nixon MD Taking Active   atorvastatin (Lipitor) 80 mg tablet [id number] No TAKE 1 TABLET DAILY Fadumo Nixon MD Taking Active   cholecalciferol (Vitamin D-3) 50 mcg (2,000 unit) capsule [id number] No Take 1 capsule (50 mcg) by mouth. Historical MD Lesvia Taking Active   coenzyme Q-10 100 mg capsule [id number] No Take 1 capsule (100 mg) by mouth once daily. Historical Provider, MD Taking Active   empagliflozin (Jardiance) 10 mg [id number] No Take 1 tablet (10 mg) by mouth once daily. Fadumo Nixon MD Taking  24 6509   escitalopram (Lexapro) 20 mg tablet [id number] No TAKE 1 TABLET DAILY Fadumo BUTTS  MD Tiffani Taking Active   gabapentin (Neurontin) 300 mg capsule [id number] No Take 1 capsule (300 mg) by mouth 3 times a day. Historical Provider, MD Taking Active   levothyroxine (Synthroid, Levoxyl) 88 mcg tablet [id number] No TAKE 1 TABLET EVERY MORNING BEFORE BREAKFAST ON AN EMPTY STOMACH Fadumo Nixon MD Taking Active   metFORMIN (Glucophage) 500 mg tablet [id number] No Take 1 tablet (500 mg) by mouth 2 times daily (morning and late afternoon). Fadumo Nixon MD Taking Active   multivitamin-min-iron-FA-vit K (Adults Multivitamin) 18 mg iron-400 mcg-25 mcg tablet [id number] No Take by mouth. Historical Provider, MD Taking Active   tirzepatide, weight loss, (Zepbound) 2.5 mg/0.5 mL injection [id number] No Inject 2.5 mg under the skin every 7 days. Fadumo Nixon MD Taking Active   triamcinolone (Kenalog) 0.1 % cream [id number] No Apply topically 2 times a day. Historical Provider, MD Taking Active   triamterene-hydrochlorothiazid (Maxzide-25) 37.5-25 mg tablet [id number] No TAKE 1 TABLET DAILY Fadumo Nixon MD Taking Active                    Allergies   Allergen Reactions    Banana Angioedema    Cantaloupe Angioedema     Cantalope    Watermelon Angioedema       Social History     Socioeconomic History    Marital status:      Spouse name: Not on file    Number of children: Not on file    Years of education: Not on file    Highest education level: Not on file   Occupational History    Not on file   Tobacco Use    Smoking status: Never    Smokeless tobacco: Never   Substance and Sexual Activity    Alcohol use: Not Currently    Drug use: Never    Sexual activity: Not on file   Other Topics Concern    Not on file   Social History Narrative    Not on file     Social Determinants of Health     Financial Resource Strain: Not on file   Food Insecurity: Not on file   Transportation Needs: Not on file   Physical Activity: Not on file   Stress: Not on file   Social Connections: Not on file    Intimate Partner Violence: Not on file   Housing Stability: Not on file       Past Surgical History:   Procedure Laterality Date    COLONOSCOPY W/ POLYPECTOMY  10/2020    mucosal fold-due 2025    SKIN SURGERY  2016    MOHS x 5-last 2016    TONSILLECTOMY  1955    TOTAL ABDOMINAL HYSTERECTOMY W/ BILATERAL SALPINGOOPHORECTOMY  01/2022    endometrial CA       No results found.       Scribe Attestation  By signing my name below, IKayy Scribe   attest that this documentation has been prepared under the direction and in the presence of Carlos Hussein MD.

## 2024-09-26 ENCOUNTER — OFFICE VISIT (OUTPATIENT)
Dept: ORTHOPEDIC SURGERY | Facility: CLINIC | Age: 74
End: 2024-09-26
Payer: MEDICARE

## 2024-09-26 ENCOUNTER — APPOINTMENT (OUTPATIENT)
Dept: ORTHOPEDIC SURGERY | Facility: CLINIC | Age: 74
End: 2024-09-26
Payer: MEDICARE

## 2024-09-26 DIAGNOSIS — M17.0 PRIMARY OSTEOARTHRITIS OF BOTH KNEES: Primary | ICD-10-CM

## 2024-09-26 PROCEDURE — 1159F MED LIST DOCD IN RCRD: CPT | Performed by: ORTHOPAEDIC SURGERY

## 2024-09-26 PROCEDURE — L1812 KO ELASTIC W/JOINTS PRE OTS: HCPCS | Performed by: ORTHOPAEDIC SURGERY

## 2024-09-26 PROCEDURE — 99213 OFFICE O/P EST LOW 20 MIN: CPT | Performed by: ORTHOPAEDIC SURGERY

## 2024-09-26 PROCEDURE — 20610 DRAIN/INJ JOINT/BURSA W/O US: CPT | Performed by: ORTHOPAEDIC SURGERY

## 2024-09-26 RX ORDER — TRIAMCINOLONE ACETONIDE 40 MG/ML
40 INJECTION, SUSPENSION INTRA-ARTICULAR; INTRAMUSCULAR
Status: COMPLETED | OUTPATIENT
Start: 2024-09-26 | End: 2024-09-26

## 2024-09-26 RX ORDER — LIDOCAINE HYDROCHLORIDE 10 MG/ML
8 INJECTION, SOLUTION INFILTRATION; PERINEURAL
Status: COMPLETED | OUTPATIENT
Start: 2024-09-26 | End: 2024-09-26

## 2024-10-03 DIAGNOSIS — E78.2 MIXED HYPERLIPIDEMIA: ICD-10-CM

## 2024-10-03 RX ORDER — ATORVASTATIN CALCIUM 80 MG/1
80 TABLET, FILM COATED ORAL DAILY
Qty: 90 TABLET | Refills: 3 | Status: SHIPPED | OUTPATIENT
Start: 2024-10-03

## 2024-11-12 ENCOUNTER — LAB (OUTPATIENT)
Dept: LAB | Facility: LAB | Age: 74
End: 2024-11-12
Payer: MEDICARE

## 2024-11-12 DIAGNOSIS — N18.31 CHRONIC KIDNEY DISEASE, STAGE 3A (MULTI): Primary | ICD-10-CM

## 2024-11-12 LAB
ALBUMIN SERPL BCP-MCNC: 4.2 G/DL (ref 3.4–5)
ANION GAP SERPL CALC-SCNC: 13 MMOL/L (ref 10–20)
BUN SERPL-MCNC: 20 MG/DL (ref 6–23)
CALCIUM SERPL-MCNC: 9.9 MG/DL (ref 8.6–10.3)
CHLORIDE SERPL-SCNC: 102 MMOL/L (ref 98–107)
CO2 SERPL-SCNC: 26 MMOL/L (ref 21–32)
CREAT SERPL-MCNC: 0.96 MG/DL (ref 0.5–1.05)
EGFRCR SERPLBLD CKD-EPI 2021: 63 ML/MIN/1.73M*2
GLUCOSE SERPL-MCNC: 109 MG/DL (ref 74–99)
PHOSPHATE SERPL-MCNC: 3 MG/DL (ref 2.5–4.9)
POTASSIUM SERPL-SCNC: 4.3 MMOL/L (ref 3.5–5.3)
SODIUM SERPL-SCNC: 137 MMOL/L (ref 136–145)

## 2024-11-12 PROCEDURE — 80069 RENAL FUNCTION PANEL: CPT

## 2024-11-12 PROCEDURE — 36415 COLL VENOUS BLD VENIPUNCTURE: CPT

## 2024-12-12 DIAGNOSIS — I10 ESSENTIAL HYPERTENSION: ICD-10-CM

## 2024-12-13 RX ORDER — TRIAMTERENE/HYDROCHLOROTHIAZID 37.5-25 MG
1 TABLET ORAL DAILY
Qty: 90 TABLET | Refills: 3 | Status: SHIPPED | OUTPATIENT
Start: 2024-12-13

## 2024-12-17 NOTE — PROGRESS NOTES
Chief Complaint   Patient presents with    Left Knee - Follow-up     S/P cortisone injection 9/26/2024    Right Knee - Follow-up     S/P cortisone injection 9/26/2024     History of Present Illness:  We gave her bilateral cortisone injections on 09/26/24. They seem to be working well for her and give her about 3 months of relief. She asks about repeating those today as she is leaving to go to [place] with her family.     Bilateral knee cortisone injections on 06/06/24 provided relief for a little over 3 months. She would like repeat injections today. She is going to [place] December 28th to visit her grandkids.     She completed a gel series in her right knee on 05/09/24. She last had a cortisone injection in her right knee on 10/19/24. She states that she got more relief out of the cortisone injections than she did gel. Her left knee is now bothering her and she would like an x-ray of it at some point. She has a history of prediabetes.     Imaging:  Radiographs Knee: No acute fractures or dislocations.  Shows grade 4 medial compartment arthritis in the right knee.     Assessment:   Bilateral knee arthritis flare    Plan:  We reviewed the role of imaging, physical therapy, injections and the time frame to healing and correlation with outcome.  Continue ice, ibuprofen, and home exercise.   Left knee brace  Cortisone injection in bilateral knees today. The right is slightly worse than the left.  Follow-up every 3 months for repeat injections    L Inj/Asp: bilateral knee on 12/19/2024 8:43 AM  Indications: pain  Details: 22 G needle, anterolateral approach  Medications (Right): 8 mL lidocaine 10 mg/mL (1 %); 2.5 mg triamcinolone acetonide 40 mg/mL  Medications (Left): 8 mL lidocaine 10 mg/mL (1 %); 2.5 mg triamcinolone acetonide 40 mg/mL  Outcome: tolerated well, no immediate complications  Procedure, treatment alternatives, risks and benefits explained, specific risks discussed. Consent was given by the patient.  Immediately prior to procedure a time out was called to verify the correct patient, procedure, equipment, support staff and site/side marked as required. Patient was prepped and draped in the usual sterile fashion.            Physical Exam:  Well-nourished, well-developed. No acute distress. Alert and oriented x3. Responds appropriately to questioning. Good mood. Normal affect.  Physical Exam  Bilateral Knee:  ROM: 110  Pain along medial joint line  Positive Rocky´s test/PositiveApley Grind  Neurovascular exam normal distally  Palpable pedal pulse and good cap refill     Review of Systems:  GENERAL: Negative for malaise, significant weight loss, fever  MUSCULOSKELETAL: See HPI  NEURO:  Negative     Past Medical History:   Diagnosis Date    BCC (basal cell carcinoma)     Endometrial cancer (Multi) 01/31/2024    Endometrial hyperplasia with atypia     History of mammogram 06/21/2024    cat 1       Medication Documentation Review Audit       Reviewed by Gilda Castillo MA (Medical Assistant) on 09/26/24 at 0837      Medication Order Taking? Sig Documenting Provider Last Dose Status   acetaminophen (Tylenol) 500 mg tablet [id number] No Take 1 tablet (500 mg) by mouth every 4 hours if needed for mild pain (1 - 3). Historical Provider, MD Taking Active   ammonium lactate (Lac-Hydrin) 12 % lotion [id number] No Apply topically once daily. Historical Provider, MD Taking Active   aspirin 81 mg EC tablet [id number] No Take 1 tablet (81 mg) by mouth once daily. Historical Provider, MD Taking Active   atenolol (Tenormin) 50 mg tablet [id number] No TAKE 1 TABLET DAILY Fadumo Nixon MD Taking Active   atorvastatin (Lipitor) 80 mg tablet [id number] No TAKE 1 TABLET DAILY Fadumo Nixon MD Taking Active   cholecalciferol (Vitamin D-3) 50 mcg (2,000 unit) capsule [id number] No Take 1 capsule (50 mcg) by mouth. Historical Provider, MD Taking Active   coenzyme Q-10 100 mg capsule [id number] No Take 1 capsule (100 mg) by mouth  once daily. Wilmer Provider, MD Taking Active   empagliflozin (Jardiance) 10 mg [id number] No Take 1 tablet (10 mg) by mouth once daily. Fadumo Nixon MD Taking  24 2359   escitalopram (Lexapro) 20 mg tablet [id number] No TAKE 1 TABLET DAILY Fadumo Nixon MD Taking Active   gabapentin (Neurontin) 300 mg capsule [id number] No Take 1 capsule (300 mg) by mouth 3 times a day. Wilmer Lakhani MD Taking Active   levothyroxine (Synthroid, Levoxyl) 88 mcg tablet [id number] No TAKE 1 TABLET EVERY MORNING BEFORE BREAKFAST ON AN EMPTY STOMACH Fadumo Nixon MD Taking Active   metFORMIN (Glucophage) 500 mg tablet [id number] No Take 1 tablet (500 mg) by mouth 2 times daily (morning and late afternoon). Fadumo Nixon MD Taking Active   multivitamin-min-iron-FA-vit K (Adults Multivitamin) 18 mg iron-400 mcg-25 mcg tablet [id number] No Take by mouth. Wilmer Provider, MD Taking Active   tirzepatide, weight loss, (Zepbound) 2.5 mg/0.5 mL injection [id number] No Inject 2.5 mg under the skin every 7 days. Fadumo Nixon MD Taking Active   triamcinolone (Kenalog) 0.1 % cream [id number] No Apply topically 2 times a day. Wilmer Provider, MD Taking Active   triamterene-hydrochlorothiazid (Maxzide-25) 37.5-25 mg tablet [id number] No TAKE 1 TABLET DAILY Fadumo Nixon MD Taking Active                    Allergies   Allergen Reactions    Banana Angioedema    Cantaloupe Angioedema     Cantalope    Watermelon Angioedema       Social History     Socioeconomic History    Marital status:      Spouse name: Not on file    Number of children: Not on file    Years of education: Not on file    Highest education level: Not on file   Occupational History    Not on file   Tobacco Use    Smoking status: Never    Smokeless tobacco: Never   Substance and Sexual Activity    Alcohol use: Not Currently    Drug use: Never    Sexual activity: Not on file   Other Topics Concern    Not on file    Social History Narrative    Not on file     Social Drivers of Health     Financial Resource Strain: Not on file   Food Insecurity: Not on file   Transportation Needs: Not on file   Physical Activity: Not on file   Stress: Not on file   Social Connections: Not on file   Intimate Partner Violence: Not on file   Housing Stability: Not on file       Past Surgical History:   Procedure Laterality Date    COLONOSCOPY W/ POLYPECTOMY  10/2020    mucosal fold-due 2025    SKIN SURGERY  2016    MOHS x 5-last 2016    TONSILLECTOMY  1955    TOTAL ABDOMINAL HYSTERECTOMY W/ BILATERAL SALPINGOOPHORECTOMY  01/2022    endometrial CA       No results found.       Scribe Attestation  By signing my name below, IKayy Scribe   attest that this documentation has been prepared under the direction and in the presence of Carlos Hussein MD.

## 2024-12-19 ENCOUNTER — APPOINTMENT (OUTPATIENT)
Dept: ORTHOPEDIC SURGERY | Facility: CLINIC | Age: 74
End: 2024-12-19
Payer: MEDICARE

## 2024-12-19 DIAGNOSIS — M17.0 PRIMARY OSTEOARTHRITIS OF BOTH KNEES: Primary | ICD-10-CM

## 2024-12-20 RX ORDER — TRIAMCINOLONE ACETONIDE 40 MG/ML
2.5 INJECTION, SUSPENSION INTRA-ARTICULAR; INTRAMUSCULAR
Status: COMPLETED | OUTPATIENT
Start: 2024-12-19 | End: 2024-12-19

## 2024-12-20 RX ORDER — LIDOCAINE HYDROCHLORIDE 10 MG/ML
8 INJECTION, SOLUTION INFILTRATION; PERINEURAL
Status: COMPLETED | OUTPATIENT
Start: 2024-12-19 | End: 2024-12-19

## 2024-12-22 DIAGNOSIS — R73.9 HYPERGLYCEMIA: ICD-10-CM

## 2024-12-23 DIAGNOSIS — R73.9 HYPERGLYCEMIA: ICD-10-CM

## 2024-12-23 RX ORDER — EMPAGLIFLOZIN 10 MG/1
10 TABLET, FILM COATED ORAL DAILY
Qty: 90 TABLET | Refills: 3 | Status: SHIPPED | OUTPATIENT
Start: 2024-12-23

## 2025-01-23 ENCOUNTER — LAB (OUTPATIENT)
Dept: LAB | Facility: LAB | Age: 75
End: 2025-01-23
Payer: MEDICARE

## 2025-01-23 DIAGNOSIS — E03.8 OTHER SPECIFIED HYPOTHYROIDISM: ICD-10-CM

## 2025-01-23 DIAGNOSIS — E78.2 MIXED HYPERLIPIDEMIA: ICD-10-CM

## 2025-01-23 DIAGNOSIS — I10 ESSENTIAL HYPERTENSION: ICD-10-CM

## 2025-01-23 DIAGNOSIS — R73.9 HYPERGLYCEMIA: ICD-10-CM

## 2025-01-24 ENCOUNTER — LAB (OUTPATIENT)
Dept: LAB | Facility: LAB | Age: 75
End: 2025-01-24
Payer: MEDICARE

## 2025-01-24 DIAGNOSIS — E03.8 OTHER SPECIFIED HYPOTHYROIDISM: ICD-10-CM

## 2025-01-24 DIAGNOSIS — R73.9 HYPERGLYCEMIA: ICD-10-CM

## 2025-01-24 DIAGNOSIS — I10 ESSENTIAL HYPERTENSION: ICD-10-CM

## 2025-01-24 DIAGNOSIS — E78.2 MIXED HYPERLIPIDEMIA: ICD-10-CM

## 2025-01-24 LAB
ALBUMIN SERPL BCP-MCNC: 4 G/DL (ref 3.4–5)
ALP SERPL-CCNC: 109 U/L (ref 33–136)
ALT SERPL W P-5'-P-CCNC: 18 U/L (ref 7–45)
ANION GAP SERPL CALC-SCNC: 10 MMOL/L (ref 10–20)
AST SERPL W P-5'-P-CCNC: 13 U/L (ref 9–39)
BASOPHILS # BLD AUTO: 0.06 X10*3/UL (ref 0–0.1)
BASOPHILS NFR BLD AUTO: 0.7 %
BILIRUB SERPL-MCNC: 0.5 MG/DL (ref 0–1.2)
BUN SERPL-MCNC: 21 MG/DL (ref 6–23)
CALCIUM SERPL-MCNC: 9.5 MG/DL (ref 8.6–10.3)
CHLORIDE SERPL-SCNC: 103 MMOL/L (ref 98–107)
CHOLEST SERPL-MCNC: 194 MG/DL (ref 0–199)
CHOLESTEROL/HDL RATIO: 4.1
CO2 SERPL-SCNC: 31 MMOL/L (ref 21–32)
CREAT SERPL-MCNC: 1.03 MG/DL (ref 0.5–1.05)
EGFRCR SERPLBLD CKD-EPI 2021: 58 ML/MIN/1.73M*2
EOSINOPHIL # BLD AUTO: 0.18 X10*3/UL (ref 0–0.4)
EOSINOPHIL NFR BLD AUTO: 2 %
ERYTHROCYTE [DISTWIDTH] IN BLOOD BY AUTOMATED COUNT: 16.4 % (ref 11.5–14.5)
EST. AVERAGE GLUCOSE BLD GHB EST-MCNC: 131 MG/DL
GLUCOSE SERPL-MCNC: 130 MG/DL (ref 74–99)
HBA1C MFR BLD: 6.2 %
HCT VFR BLD AUTO: 38 % (ref 36–46)
HDLC SERPL-MCNC: 47.7 MG/DL
HGB BLD-MCNC: 12.1 G/DL (ref 12–16)
IMM GRANULOCYTES # BLD AUTO: 0.13 X10*3/UL (ref 0–0.5)
IMM GRANULOCYTES NFR BLD AUTO: 1.5 % (ref 0–0.9)
LDLC SERPL CALC-MCNC: ABNORMAL MG/DL
LYMPHOCYTES # BLD AUTO: 1.14 X10*3/UL (ref 0.8–3)
LYMPHOCYTES NFR BLD AUTO: 12.8 %
MCH RBC QN AUTO: 28.9 PG (ref 26–34)
MCHC RBC AUTO-ENTMCNC: 31.8 G/DL (ref 32–36)
MCV RBC AUTO: 91 FL (ref 80–100)
MONOCYTES # BLD AUTO: 0.85 X10*3/UL (ref 0.05–0.8)
MONOCYTES NFR BLD AUTO: 9.6 %
NEUTROPHILS # BLD AUTO: 6.53 X10*3/UL (ref 1.6–5.5)
NEUTROPHILS NFR BLD AUTO: 73.4 %
NON HDL CHOLESTEROL: 146 MG/DL (ref 0–149)
NRBC BLD-RTO: 0 /100 WBCS (ref 0–0)
PLATELET # BLD AUTO: 322 X10*3/UL (ref 150–450)
POTASSIUM SERPL-SCNC: 4.4 MMOL/L (ref 3.5–5.3)
PROT SERPL-MCNC: 6.1 G/DL (ref 6.4–8.2)
RBC # BLD AUTO: 4.19 X10*6/UL (ref 4–5.2)
SODIUM SERPL-SCNC: 140 MMOL/L (ref 136–145)
T4 FREE SERPL-MCNC: 0.72 NG/DL (ref 0.61–1.12)
TRIGL SERPL-MCNC: 495 MG/DL (ref 0–149)
TSH SERPL-ACNC: 2.44 MIU/L (ref 0.44–3.98)
VLDL: ABNORMAL
WBC # BLD AUTO: 8.9 X10*3/UL (ref 4.4–11.3)

## 2025-01-24 PROCEDURE — 83036 HEMOGLOBIN GLYCOSYLATED A1C: CPT

## 2025-01-24 PROCEDURE — 85025 COMPLETE CBC W/AUTO DIFF WBC: CPT

## 2025-01-24 PROCEDURE — 84443 ASSAY THYROID STIM HORMONE: CPT

## 2025-01-24 PROCEDURE — 84439 ASSAY OF FREE THYROXINE: CPT

## 2025-01-24 PROCEDURE — 80053 COMPREHEN METABOLIC PANEL: CPT

## 2025-01-24 PROCEDURE — 80061 LIPID PANEL: CPT

## 2025-01-28 ENCOUNTER — APPOINTMENT (OUTPATIENT)
Dept: PRIMARY CARE | Facility: CLINIC | Age: 75
End: 2025-01-28
Payer: MEDICARE

## 2025-01-28 VITALS
OXYGEN SATURATION: 97 % | HEART RATE: 70 BPM | HEIGHT: 68 IN | BODY MASS INDEX: 43.95 KG/M2 | DIASTOLIC BLOOD PRESSURE: 70 MMHG | WEIGHT: 290 LBS | TEMPERATURE: 97.2 F | SYSTOLIC BLOOD PRESSURE: 128 MMHG | RESPIRATION RATE: 16 BRPM

## 2025-01-28 DIAGNOSIS — E78.2 MIXED HYPERLIPIDEMIA: ICD-10-CM

## 2025-01-28 DIAGNOSIS — F32.A ANXIETY AND DEPRESSION: ICD-10-CM

## 2025-01-28 DIAGNOSIS — R73.03 PREDIABETES: ICD-10-CM

## 2025-01-28 DIAGNOSIS — N18.30 STAGE 3 CHRONIC KIDNEY DISEASE, UNSPECIFIED WHETHER STAGE 3A OR 3B CKD (MULTI): ICD-10-CM

## 2025-01-28 DIAGNOSIS — I10 ESSENTIAL HYPERTENSION: ICD-10-CM

## 2025-01-28 DIAGNOSIS — C54.1 ENDOMETRIAL CANCER (MULTI): ICD-10-CM

## 2025-01-28 DIAGNOSIS — Z00.00 ENCOUNTER FOR MEDICARE ANNUAL WELLNESS EXAM: Primary | ICD-10-CM

## 2025-01-28 DIAGNOSIS — F41.9 ANXIETY AND DEPRESSION: ICD-10-CM

## 2025-01-28 DIAGNOSIS — Z12.11 COLON CANCER SCREENING: ICD-10-CM

## 2025-01-28 DIAGNOSIS — E03.8 OTHER SPECIFIED HYPOTHYROIDISM: ICD-10-CM

## 2025-01-28 PROBLEM — E11.42 TYPE 2 DIABETES MELLITUS WITH DIABETIC POLYNEUROPATHY, WITHOUT LONG-TERM CURRENT USE OF INSULIN: Status: ACTIVE | Noted: 2025-01-28

## 2025-01-28 PROBLEM — E11.42 TYPE 2 DIABETES MELLITUS WITH DIABETIC POLYNEUROPATHY, WITHOUT LONG-TERM CURRENT USE OF INSULIN: Status: RESOLVED | Noted: 2025-01-28 | Resolved: 2025-01-28

## 2025-01-28 PROCEDURE — 3008F BODY MASS INDEX DOCD: CPT | Performed by: FAMILY MEDICINE

## 2025-01-28 PROCEDURE — 1159F MED LIST DOCD IN RCRD: CPT | Performed by: FAMILY MEDICINE

## 2025-01-28 PROCEDURE — 99397 PER PM REEVAL EST PAT 65+ YR: CPT | Performed by: FAMILY MEDICINE

## 2025-01-28 PROCEDURE — G0446 INTENS BEHAVE THER CARDIO DX: HCPCS | Performed by: FAMILY MEDICINE

## 2025-01-28 PROCEDURE — 1170F FXNL STATUS ASSESSED: CPT | Performed by: FAMILY MEDICINE

## 2025-01-28 PROCEDURE — G0444 DEPRESSION SCREEN ANNUAL: HCPCS | Performed by: FAMILY MEDICINE

## 2025-01-28 PROCEDURE — 3078F DIAST BP <80 MM HG: CPT | Performed by: FAMILY MEDICINE

## 2025-01-28 PROCEDURE — 3074F SYST BP LT 130 MM HG: CPT | Performed by: FAMILY MEDICINE

## 2025-01-28 PROCEDURE — G0439 PPPS, SUBSEQ VISIT: HCPCS | Performed by: FAMILY MEDICINE

## 2025-01-28 PROCEDURE — 1160F RVW MEDS BY RX/DR IN RCRD: CPT | Performed by: FAMILY MEDICINE

## 2025-01-28 PROCEDURE — 1036F TOBACCO NON-USER: CPT | Performed by: FAMILY MEDICINE

## 2025-01-28 RX ORDER — TIRZEPATIDE 2.5 MG/.5ML
2.5 INJECTION, SOLUTION SUBCUTANEOUS WEEKLY
Qty: 2 ML | Refills: 3 | Status: SHIPPED | OUTPATIENT
Start: 2025-01-28

## 2025-01-28 RX ORDER — TURMERIC 400 MG
CAPSULE ORAL
COMMUNITY

## 2025-01-28 RX ORDER — LEVOMEFOLATE/B6/B12/ALGAL OIL 3-35-2 MG
1 CAPSULE ORAL
COMMUNITY
Start: 2024-11-08

## 2025-01-28 ASSESSMENT — ENCOUNTER SYMPTOMS
LOSS OF SENSATION IN FEET: 1
DEPRESSION: 0
OCCASIONAL FEELINGS OF UNSTEADINESS: 1

## 2025-01-28 ASSESSMENT — ACTIVITIES OF DAILY LIVING (ADL)
BATHING: INDEPENDENT
DOING_HOUSEWORK: INDEPENDENT
GROCERY_SHOPPING: INDEPENDENT
DRESSING: INDEPENDENT
MANAGING_FINANCES: INDEPENDENT
TAKING_MEDICATION: INDEPENDENT

## 2025-01-28 ASSESSMENT — PATIENT HEALTH QUESTIONNAIRE - PHQ9
1. LITTLE INTEREST OR PLEASURE IN DOING THINGS: NOT AT ALL
2. FEELING DOWN, DEPRESSED OR HOPELESS: NOT AT ALL
SUM OF ALL RESPONSES TO PHQ9 QUESTIONS 1 AND 2: 0
1. LITTLE INTEREST OR PLEASURE IN DOING THINGS: NOT AT ALL
SUM OF ALL RESPONSES TO PHQ9 QUESTIONS 1 AND 2: 0
2. FEELING DOWN, DEPRESSED OR HOPELESS: NOT AT ALL

## 2025-01-28 NOTE — PROGRESS NOTES
Subjective   Reason for Visit: Emmy Chang is a 74 y.o. female here for a Medicare Wellness visit.   Hba1c 6.2  Ldl 54  Covid vax: UTD  Flu: UTD  Pneumo: UTD  RSV: UTD  Shingles: UTD     CRC: due 2025  Mammogram: 6/2024  Pap: hysterectomy  Lmp: hysterectomy  Hba1c 6.2    CHECKLIST REVIEWED AND COMPLETE FOR [facility] Medicare Annual Wellness Visit Subsequent, Prediabetes (Discuss going back on Mounjaro instead of Jardiance), Hypertension, Chronic Kidney Disease, Depression, and Anxiety  ---------------------------------------------------------------------------------------------  Past Medical, Surgical, and Family History reviewed and updated in chart.    Reviewed all medications by prescribing practitioner or clinical pharmacist (such as prescriptions, OTCs, herbal therapies and supplements) and documented in the medical record.    HPI    Patient Self Assessment of Health Status  Patient Self Assessment: Good    Nutrition and Exercise:    Current Diet: HEALTHY Diet always best, minimizing excess carbs,   weight reduction advised if BMI not WNL, please maintain a NORMAL BMI 18.5-24.9    Adequate Fluid Intake: Yes  Caffeine: -aware to minimize intake, <300mg best to even take in LESS  Exercise Frequency: Regularly advised, weight bearing, strengthening, aerobic    Functional Ability/Level of Safety:  Home safety addressed: no active new concerns,   fall risk addressed  NO new hearing issues or concerns  Goodman in ADLs addressed and areas of assistance if present -noted    ANY Cognitive Impairment Observed: No cognitive impairment observed    Home Safety Risk Factors: None  --------------------------------------------------------------------------------------------  Patient Care Team:  Fadumo Nixon MD as PCP - General  Abn ccs    HPI  Patient Active Problem List   Diagnosis    Adenomatous polyp of descending colon    Anemia    Anxiety and depression    Osteoarthritis    CKD (chronic kidney disease) stage  3, GFR 30-59 ml/min (Multi)    Degenerative cervical disc    Essential hypertension    Hematuria    History of malignant neoplasm of skin    Hyperglycemia    Mixed hyperlipidemia    Other specified hypothyroidism    Polyneuropathy    Prediabetes    Scalp psoriasis    Spinal stenosis of lumbar region    Hx of malignant neoplasm of endometrium    Spondylosis of lumbar spine    Paresthesia of skin    Stage 3 chronic kidney disease (Multi)    Arthropathy    Atypical mole    Bilateral sensorineural hearing loss    Class 3 severe obesity due to excess calories with body mass index (BMI) of 40.0 to 44.9 in adult    Lumbar radiculopathy, chronic    Morbid obesity with BMI of 40.0-44.9, adult (Multi)    Tinnitus    Actinic keratitis    Encounter to discuss test results    Agatston coronary artery calcium score less than 100    Type 2 diabetes mellitus with diabetic polyneuropathy, without long-term current use of insulin      Past Surgical History:   Procedure Laterality Date    COLONOSCOPY W/ POLYPECTOMY  10/2020    mucosal fold-due 2025    SKIN SURGERY  2016    MOHS x 5-last 2016    TONSILLECTOMY  1955    TOTAL ABDOMINAL HYSTERECTOMY W/ BILATERAL SALPINGOOPHORECTOMY  01/2022    endometrial CA         PHQ2(-) No active depressed mood or not in crisis, 5min. spent in discussion.    Review of Systems:    NO Seizures  NO CAD  NO CVA    This patient has   NO history of recent Covid nor flu symptoms,  NO Fever nor chills,  NO Chest pain, shortness of breath nor paroxysmal nocturnal dyspnea,  NO Nausea, vomiting, nor diarrhea,  NO Hematochezia nor melena,  NO Dysuria, hematuria, nor new incontinence issues  NO new severe headaches nor neurological complaints,  NO new issues with anxiety nor depression nor new psychiatric complaints,  NO suicidal nor homicidal ideations.  ---------------------------------------------------------------------------------------------   OBJECTIVE:  /70   Pulse 70   Temp 36.2 °C (97.2 °F)  "(Temporal)   Resp 16   Ht 1.727 m (5' 8\")   Wt 132 kg (290 lb)   LMP  (LMP Unknown)   SpO2 97%   BMI 44.09 kg/m²      General:  alert, oriented, no acute distress.  No obvious skin rashes noted.   No gait disturbance noted.    Mood is pleasant, not tearful, no signs of emotional distress.  Not appearing intoxicated or altered.   No voiced delusions,   Normal, appropriate behavior.    HEENT: Normocephalic, atraumatic,   Pupils round, reactive to light  Extraocular motions intact and wnl  Tympanic membranes normal    Neck: no nuchal rigidity  No masses palpable.  No carotid bruits.  No thyromegaly.    Respiratory: Equal breath sounds  No wheezes,    rales,    nor rhonchi  No respiratory distress.    Heart: Regular rate and rhythm, no    murmurs  no rubs/gallops    Abdomen: no masses palpable, no rebound nor guarding, no rebound nor guarding.ovwt    Extremities: NO cyanosis noted, no clubbing.   Sl mild ble edema noted.  2+dorsalis pedis pulses.    Normal-not antalgic, steady gait.    Lab on 01/24/2025   Component Date Value Ref Range Status    Thyroid Stimulating Hormone 01/24/2025 2.44  0.44 - 3.98 mIU/L Final    Thyroxine, Free 01/24/2025 0.72  0.61 - 1.12 ng/dL Final    Cholesterol 01/24/2025 194  0 - 199 mg/dL Final          Age      Desirable   Borderline High   High     0-19 Y     0 - 169       170 - 199     >/= 200    20-24 Y     0 - 189       190 - 224     >/= 225         >24 Y     0 - 199       200 - 239     >/= 240   **All ranges are based on fasting samples. Specific   therapeutic targets will vary based on patient-specific   cardiac risk.    Pediatric guidelines reference:Pediatrics 2011, 128(S5).Adult guidelines reference: NCEP ATPIII Guidelines,TL 2001, 258:2486-97    Venipuncture immediately after or during the administration of Metamizole may lead to falsely low results. Testing should be performed immediately prior to Metamizole dosing.    HDL-Cholesterol 01/24/2025 47.7  mg/dL Final      Age  "      Very Low   Low     Normal    High    0-19 Y    < 35      < 40     40-45     ----  20-24 Y    ----     < 40      >45      ----        >24 Y      ----     < 40     40-60      >60      Cholesterol/HDL Ratio 01/24/2025 4.1   Final      Ref Values  Desirable  < 3.4  High Risk  > 5.0    LDL Calculated 01/24/2025    Final    The calculation of LDL and VLDL are inaccurate when the Triglycerides are greater than 400 mg/dL or when the patient is non-fasting. If LDL measurement is necessary contact the testing laboratory for an alternative LDL assay.                                  Near   Borderline      AGE      Desirable  Optimal    High     High     Very High     0-19 Y     0 - 109     ---    110-129   >/= 130     ----    20-24 Y     0 - 119     ---    120-159   >/= 160     ----      >24 Y     0 -  99   100-129  130-159   160-189     >/=190      VLDL 01/24/2025    Final    Unable to calculate VLDL.    Triglycerides 01/24/2025 495 (H)  0 - 149 mg/dL Final    Age              Desirable        Borderline         High        Very High  SEX:B           mg/dL             mg/dL               mg/dL      mg/dL  <=14D                       ----               ----        ----  15D-365D                    ----               ----        ----  1Y-9Y           0-74               75-99             >=100       ----  10Y-19Y        0-89                            >=130       ----  20Y-24Y        0-114             115-149             >=150      ----  >= 25Y         0-149             150-199             200-499    >=500      Venipuncture immediately after or during the administration of Metamizole may lead to falsely low results. Testing should be performed immediately prior to Metamizole dosing.    Non HDL Cholesterol 01/24/2025 146  0 - 149 mg/dL Final          Age       Desirable   Borderline High   High     Very High     0-19 Y     0 - 119       120 - 144     >/= 145    >/= 160    20-24 Y     0 - 149       150 - 189      >/= 190      ----         >24 Y    30 mg/dL above LDL Cholesterol goal      Hemoglobin A1C 01/24/2025 6.2 (H)  See comment % Final    Estimated Average Glucose 01/24/2025 131  Not Established mg/dL Final    WBC 01/24/2025 8.9  4.4 - 11.3 x10*3/uL Final    nRBC 01/24/2025 0.0  0.0 - 0.0 /100 WBCs Final    RBC 01/24/2025 4.19  4.00 - 5.20 x10*6/uL Final    Hemoglobin 01/24/2025 12.1  12.0 - 16.0 g/dL Final    Hematocrit 01/24/2025 38.0  36.0 - 46.0 % Final    MCV 01/24/2025 91  80 - 100 fL Final    MCH 01/24/2025 28.9  26.0 - 34.0 pg Final    MCHC 01/24/2025 31.8 (L)  32.0 - 36.0 g/dL Final    RDW 01/24/2025 16.4 (H)  11.5 - 14.5 % Final    Platelets 01/24/2025 322  150 - 450 x10*3/uL Final    Neutrophils % 01/24/2025 73.4  40.0 - 80.0 % Final    Immature Granulocytes %, Automated 01/24/2025 1.5 (H)  0.0 - 0.9 % Final    Immature Granulocyte Count (IG) includes promyelocytes, myelocytes and metamyelocytes but does not include bands. Percent differential counts (%) should be interpreted in the context of the absolute cell counts (cells/UL).    Lymphocytes % 01/24/2025 12.8  13.0 - 44.0 % Final    Monocytes % 01/24/2025 9.6  2.0 - 10.0 % Final    Eosinophils % 01/24/2025 2.0  0.0 - 6.0 % Final    Basophils % 01/24/2025 0.7  0.0 - 2.0 % Final    Neutrophils Absolute 01/24/2025 6.53 (H)  1.60 - 5.50 x10*3/uL Final    Percent differential counts (%) should be interpreted in the context of the absolute cell counts (cells/uL).    Immature Granulocytes Absolute, Au* 01/24/2025 0.13  0.00 - 0.50 x10*3/uL Final    Lymphocytes Absolute 01/24/2025 1.14  0.80 - 3.00 x10*3/uL Final    Monocytes Absolute 01/24/2025 0.85 (H)  0.05 - 0.80 x10*3/uL Final    Eosinophils Absolute 01/24/2025 0.18  0.00 - 0.40 x10*3/uL Final    Basophils Absolute 01/24/2025 0.06  0.00 - 0.10 x10*3/uL Final    Glucose 01/24/2025 130 (H)  74 - 99 mg/dL Final    Sodium 01/24/2025 140  136 - 145 mmol/L Final    Potassium 01/24/2025 4.4  3.5 - 5.3 mmol/L  Final    Chloride 01/24/2025 103  98 - 107 mmol/L Final    Bicarbonate 01/24/2025 31  21 - 32 mmol/L Final    Anion Gap 01/24/2025 10  10 - 20 mmol/L Final    Urea Nitrogen 01/24/2025 21  6 - 23 mg/dL Final    Creatinine 01/24/2025 1.03  0.50 - 1.05 mg/dL Final    eGFR 01/24/2025 58 (L)  >60 mL/min/1.73m*2 Final    Calculations of estimated GFR are performed using the 2021 CKD-EPI Study Refit equation without the race variable for the IDMS-Traceable creatinine methods.  https://jasn.asnjournals.org/content/early/2021/09/22/ASN.6029068576    Calcium 01/24/2025 9.5  8.6 - 10.3 mg/dL Final    Albumin 01/24/2025 4.0  3.4 - 5.0 g/dL Final    Alkaline Phosphatase 01/24/2025 109  33 - 136 U/L Final    Total Protein 01/24/2025 6.1 (L)  6.4 - 8.2 g/dL Final    AST 01/24/2025 13  9 - 39 U/L Final    Bilirubin, Total 01/24/2025 0.5  0.0 - 1.2 mg/dL Final    ALT 01/24/2025 18  7 - 45 U/L Final    Patients treated with Sulfasalazine may generate falsely decreased results for ALT.   Lab on 11/12/2024   Component Date Value Ref Range Status    Glucose 11/12/2024 109 (H)  74 - 99 mg/dL Final    Sodium 11/12/2024 137  136 - 145 mmol/L Final    Potassium 11/12/2024 4.3  3.5 - 5.3 mmol/L Final    Chloride 11/12/2024 102  98 - 107 mmol/L Final    Bicarbonate 11/12/2024 26  21 - 32 mmol/L Final    Anion Gap 11/12/2024 13  10 - 20 mmol/L Final    Urea Nitrogen 11/12/2024 20  6 - 23 mg/dL Final    Creatinine 11/12/2024 0.96  0.50 - 1.05 mg/dL Final    eGFR 11/12/2024 63  >60 mL/min/1.73m*2 Final    Calculations of estimated GFR are performed using the 2021 CKD-EPI Study Refit equation without the race variable for the IDMS-Traceable creatinine methods.  https://jasn.asnjournals.org/content/early/2021/09/22/ASN.1263452350    Calcium 11/12/2024 9.9  8.6 - 10.3 mg/dL Final    Phosphorus 11/12/2024 3.0  2.5 - 4.9 mg/dL Final    The performance characteristics of phosphorus testing in heparinized plasma have been validated by the individual [facility]  laboratory site where testing is performed. Testing on heparinized plasma is not approved by the FDA; however, such approval is not necessary.    Albumin 11/12/2024 4.2  3.4 - 5.0 g/dL Final        Assessment/Plan     Problem List Items Addressed This Visit       Anxiety and depression    CKD (chronic kidney disease) stage 3, GFR 30-59 ml/min (Multi)    Essential hypertension    Mixed hyperlipidemia    Other specified hypothyroidism    Prediabetes    Relevant Medications    tirzepatide (Mounjaro) 2.5 mg/0.5 mL pen injector     Other Visit Diagnoses       Encounter for Medicare annual wellness exam    -  Primary    Colon cancer screening        Relevant Orders    Colonoscopy Screening; Average Risk Patient    BMI 40.0-44.9, adult (Multi)        Relevant Medications    tirzepatide (Mounjaro) 2.5 mg/0.5 mL pen injector    Endometrial cancer (Multi)              Advance Care Planning Note   Discussion Date: 01/28/25   Discussion Participants: patient  16 min spent discussing ACP w pt    The patient wishes to discuss Advance Care Planning today and the following is a brief summary of our discussion.     Patient has capacity to make their own medical decisions: Yes  Health Care Agent/Surrogate Decision Maker documented in chart: Yes    Documents on file and valid:  Advance Directive/Living Will: advised today  Health Care Power of : advised today  Communication of Medical Status/Prognosis:   yes   Communication of Treatment Goals/Options:   yes  Treatment Decisions/involved with patient today  yes  Time Statement: Total face to face time spent on advance care planning was <30 minutes with <30 minutes spent in counseling, including the explanation.    SEE ME AT NEXT REGULARLY SCHEDULED VISIT-sooner if condition deteriorates or new problems arise.    PATIENT WOULD LIKE TO BE A FULL CODE    NO uncontrolled DEPRESSION noted  Assessed and reviewed for opioid use.  NO EVIDENCE OF SIGNIFICANT DEMENTIA    Signs and  symptoms of concern with depression-if in crisis -(no current HI/SI) will let us know and proceed to ER   Signs/symptoms of concern with dementia-and need to contact us if they occur discussed.    ASCVD  28.1 %   addressed and risk reduction conversation took place    All above counseling 15 minutes in conversation/documentation etc    Mood counseling 5min- no uncontrolled depression, good support system, has crisis plan if occurs.  Included BMI counseling and options if BMI>30    QUESTIONS answered  No cardiac sxs  Will exercise more reqs mounjaro  This medications risks, benefits, and alternatives were discussed with patient at length.  If any unwanted side effects occur-discontinue medicine and call the office for discussion.  Advised stay hydrated and reg Bms-if issues or side effects  Feet pain IMPROVED only numbness persists  No syncope      Next visit addressed -regular visit as scheduled and follow up sooner if condition deterioration or new problems arise.    This completes Emmy Chang ANNUAL MEDICARE WELLNESS VISIT today.  If no other follow ups discussed: Please follow up in 1 year for NEXT ANNUAL MEDICARE WELLNESS VISIT.  6mo labs and appt  Sees nephro  No major falls  One minor few wks ago-declines PT    Fadumo Nixon MD    This documentation is subject to inadvertent typing and other similar clerical/grammatic errors etc.

## 2025-02-21 NOTE — PROGRESS NOTES
Patient ID: Emmy Chang is a 74 y.o. female.  Primary Care Provider: Fadumo Nixon MD    Subjective    Emmy Chang  Referral from Dr. Stacy Haque  PCP:  Fadumo Nixon  72 yo with Stage 1B, Grade 1 endometrial cancer sp TVH BS on 1/19/22. MMR proficient.  Tumor History:   10/15/21 hysteroscopy/polypectomy  demonstrating endometrial hyperplasia with focal atypia, involving endometrial polyp   10/4/21 Pelvic ultrasound 1.1 cm endometrial thickness, normal sized uterus, no adnexal masses.  1/19/22 TVH, BS - final path. FIGO grade 1, endometrioid carcinoma, 55% myometrial invasion, no LVSI, MMR proficient    PMH:    Obesity with BMI 45  CKD stage G31/A2   Anxiety/depression, Rx Celexa  Mixed hyperlipidemia  Hypothyroidism  PSH:   Colonoscopy 2020  MOHS surgery  FH:  Colon cancer      Objective    Visit Vitals  /80 (BP Location: Right arm, Patient Position: Sitting, BP Cuff Size: Adult)   Pulse 79   Temp 36.6 °C (97.9 °F) (Temporal)   Resp 16       Interval history: Patient is a 74 year old female status post vaginal hysterectomy/BSO on 1/19/22 with final pathology revealing FIGO grade 1, stage 1B endometrioid carcinoma, 55% myoinvasion without LVSI. MMR intact. Here for 6 month follow up.  Patient denies any vaginal bleeding, pink tinged discharge. Patient started on Mounjaro has some irritable bowels. Appetite has been good.  Energy levels are baseline.  Patient denies hematuria.  Patient denies urinary leakage or incontinence. Mammogram is up to date.  Patient is not currently sexually active.      Physical Exam:    Constitutional: Doing well. MANUEL  Eyes: PERRL  ENMT: Moist mucus membranes  Head/Neck: Supple. Symmetrical  Cardiovascular: Regular, rate and rhythm. 2+ equal pulses of the extremities  Respiratory/Thorax: CTA. RRR. Chest rise symmetrical.  Gastrointestinal: Non-distended, soft, non-tender  Genitourinary:   Normal external female genitalia. No vulvar lesions noted, erythema  noted.   Speculum exam: Smooth vaginal walls without lesions or masses. Vaginal cuff visualized without lesions. Surgically absent cervix.  Bimanual exam: Smooth vaginal wall without lesions or masses.  Surgically absent uterus, cervix, and adnexa.  Rectovaginal exam: smooth rectovaginal septum without lesions or masses  Musculoskeletal: ROM intact, no joint swelling, normal strength  Extremities: No edema  Neurological: Alert and oriented x 3. Pleasant and cooperative.  Lymphatic: No lymphadenopathy. No lymphedema  Psychological: Appropriate mood and behavior  Skin: Warm and dry, no lesions, no rashes    A complete review of systems was performed and all systems were normal except what is noted in the interval history.        Assessment/Plan  Patient is a 74 year old female status post vaginal hysterectomy/BSO on 1/19/22 with final pathology revealing FIGO grade 1, stage 1B endometrioid carcinoma, 55% myoinvasion without LVSI. MMR intact.  MANUEL 3 years.       PLAN:  F/U in 6 months or as needed  Physical examination was within normal limits today.  She is currently MANUEL.  We reviewed signs and symptoms of possible recurrence with the patient and she will call our office should she experience any of these.

## 2025-02-25 ENCOUNTER — OFFICE VISIT (OUTPATIENT)
Dept: GYNECOLOGIC ONCOLOGY | Facility: CLINIC | Age: 75
End: 2025-02-25
Payer: MEDICARE

## 2025-02-25 VITALS
SYSTOLIC BLOOD PRESSURE: 140 MMHG | DIASTOLIC BLOOD PRESSURE: 80 MMHG | TEMPERATURE: 97.9 F | RESPIRATION RATE: 16 BRPM | WEIGHT: 280.87 LBS | HEART RATE: 79 BPM | OXYGEN SATURATION: 97 % | BODY MASS INDEX: 42.71 KG/M2

## 2025-02-25 DIAGNOSIS — C54.1 ENDOMETRIAL CANCER (MULTI): Primary | ICD-10-CM

## 2025-02-25 PROCEDURE — 3077F SYST BP >= 140 MM HG: CPT | Performed by: NURSE PRACTITIONER

## 2025-02-25 PROCEDURE — 3079F DIAST BP 80-89 MM HG: CPT | Performed by: NURSE PRACTITIONER

## 2025-02-25 PROCEDURE — 99213 OFFICE O/P EST LOW 20 MIN: CPT | Performed by: NURSE PRACTITIONER

## 2025-02-25 PROCEDURE — 1126F AMNT PAIN NOTED NONE PRSNT: CPT | Performed by: NURSE PRACTITIONER

## 2025-02-25 PROCEDURE — 1036F TOBACCO NON-USER: CPT | Performed by: NURSE PRACTITIONER

## 2025-02-25 PROCEDURE — 1159F MED LIST DOCD IN RCRD: CPT | Performed by: NURSE PRACTITIONER

## 2025-02-25 ASSESSMENT — PAIN SCALES - GENERAL: PAINLEVEL_OUTOF10: 0-NO PAIN

## 2025-02-26 ENCOUNTER — APPOINTMENT (OUTPATIENT)
Dept: GYNECOLOGIC ONCOLOGY | Facility: CLINIC | Age: 75
End: 2025-02-26
Payer: MEDICARE

## 2025-02-26 ENCOUNTER — PATIENT MESSAGE (OUTPATIENT)
Dept: PRIMARY CARE | Facility: CLINIC | Age: 75
End: 2025-02-26
Payer: MEDICARE

## 2025-02-26 DIAGNOSIS — R73.03 PREDIABETES: ICD-10-CM

## 2025-02-26 RX ORDER — TIRZEPATIDE 5 MG/.5ML
5 INJECTION, SOLUTION SUBCUTANEOUS WEEKLY
Qty: 2 ML | Refills: 0 | Status: SHIPPED | OUTPATIENT
Start: 2025-02-26

## 2025-03-20 ENCOUNTER — APPOINTMENT (OUTPATIENT)
Dept: ORTHOPEDIC SURGERY | Facility: CLINIC | Age: 75
End: 2025-03-20
Payer: MEDICARE

## 2025-03-25 NOTE — PROGRESS NOTES
Chief Complaint   Patient presents with    Left Knee - Follow-up     S/P cortisone injection 12/19/2024    Right Knee - Follow-up     S/P cortisone injection 12/19/2024     History of Present Illness:  3/27/25: We gave her a cortisone injection into the bilateral knee on 12/19/24. She got good relief with the last injection but they have started to wear off over the last few weeks.     12/19/24: We gave her bilateral cortisone injections on 09/26/24. They seem to be working well for her and give her about 3 months of relief. She asks about repeating those today as she is leaving to go to [place] with her family.     9/26/24: Bilateral knee cortisone injections on 06/06/24 provided relief for a little over 3 months. She would like repeat injections today. She is going to [place] December 28th to visit her grandkids.     6/6/24: She completed a gel series in her right knee on 05/09/24. She last had a cortisone injection in her right knee on 10/19/24. She states that she got more relief out of the cortisone injections than she did gel. Her left knee is now bothering her and she would like an x-ray of it at some point. She has a history of prediabetes.     3/28/24: Patient here today unfortunately starting to bother her again. We done 2 separate cortisone injections over the last year for her mild arthritis. They do work fairly well and they last for just about 5 months or so. General well options, getting gel injections as viscosupplementation.     Imaging:  Radiographs Knee: No acute fractures or dislocations.  Shows grade 4 medial compartment arthritis in the right knee.     Assessment:   Bilateral knee arthritis flare    Plan:  We reviewed the role of imaging, physical therapy, injections and the time frame to healing and correlation with outcome.  Continue ice and home exercise.   Cortisone injection in bilateral knees today.   1 year handicap placard  Follow-up in 3 months. We discussed knee replacement and she  is thoughtfully considering this option.    L Inj/Asp: bilateral knee on 3/27/2025 9:54 AM  Indications: pain  Details: 22 G needle, anterolateral approach  Medications (Right): 8 mL lidocaine 10 mg/mL (1 %); 2.5 mg triamcinolone acetonide 40 mg/mL  Medications (Left): 8 mL lidocaine 10 mg/mL (1 %); 2.5 mg triamcinolone acetonide 40 mg/mL  Outcome: tolerated well, no immediate complications  Procedure, treatment alternatives, risks and benefits explained, specific risks discussed. Consent was given by the patient. Immediately prior to procedure a time out was called to verify the correct patient, procedure, equipment, support staff and site/side marked as required. Patient was prepped and draped in the usual sterile fashion.          Physical Exam:  Well-nourished, well-developed. No acute distress. Alert and oriented x3. Responds appropriately to questioning. Good mood. Normal affect.  Physical Exam  Bilateral Knee:  ROM: 110  Pain along medial joint line  Positive Rocky´s test/PositiveApley Grind  Neurovascular exam normal distally  Palpable pedal pulse and good cap refill     Review of Systems:  GENERAL: Negative for malaise, significant weight loss, fever  MUSCULOSKELETAL: See HPI  NEURO:  Negative     Past Medical History:   Diagnosis Date    BCC (basal cell carcinoma)     Endometrial cancer (Multi) 01/31/2024 2022    Endometrial hyperplasia with atypia     History of mammogram 06/21/2024    cat 1       Medication Documentation Review Audit       Reviewed by Princess Simeon MA (Medical Assistant) on 02/25/25 at 0920      Medication Order Taking? Sig Documenting Provider Last Dose Status   acetaminophen (Tylenol) 500 mg tablet [id number] Yes Take 1 tablet (500 mg) by mouth every 4 hours if needed for mild pain (1 - 3). Historical Provider, MD  Active   ammonium lactate (Lac-Hydrin) 12 % lotion [id number]  Apply topically once daily.   Patient not taking: Reported on 2/25/2025    Historical Provider, MD   Active   aspirin 81 mg EC tablet [id number] Yes Take 1 tablet (81 mg) by mouth once daily. Historical Provider, MD  Active   atenolol (Tenormin) 50 mg tablet [id number] Yes TAKE 1 TABLET DAILY Fadumo Nixon MD  Active   atorvastatin (Lipitor) 80 mg tablet [id number] Yes TAKE 1 TABLET DAILY Fadumo Nixon MD  Active   cholecalciferol (Vitamin D-3) 50 mcg (2,000 unit) capsule [id number]  Take 1 capsule (50 mcg) by mouth.   Patient not taking: Reported on 2/25/2025    Historical Provider, MD  Active   coenzyme Q-10 100 mg capsule [id number] Yes Take 1 capsule (100 mg) by mouth once daily. Historical Provider, MD  Active   empagliflozin (Jardiance) 10 mg [id number] Yes Take 1 tablet (10 mg) by mouth once daily. Ildefonso Ayala DO  Active   escitalopram (Lexapro) 20 mg tablet [id number] Yes TAKE 1 TABLET DAILY Fadumo Nixon MD  Active   Foltanx RF 3 mg-35 mg-2 mg -90.314 mg capsule [id number]  Take 1 capsule by mouth every 12 hours.   Patient not taking: Reported on 2/25/2025    Historical Provider, MD  Active   Jardiance 10 mg [id number]  TAKE 1 TABLET DAILY   Patient not taking: Reported on 2/25/2025    Fadumo Nixon MD  Active   levothyroxine (Synthroid, Levoxyl) 88 mcg tablet [id number] Yes TAKE 1 TABLET EVERY MORNING BEFORE BREAKFAST ON AN EMPTY STOMACH Fadumo Nixon MD  Active   metFORMIN (Glucophage) 500 mg tablet [id number] Yes Take 1 tablet (500 mg) by mouth 2 times daily (morning and late afternoon). Fadumo Nixon MD  Active   multivitamin-min-iron-FA-vit K (Adults Multivitamin) 18 mg iron-400 mcg-25 mcg tablet [id number] Yes Take by mouth. Historical Provider, MD  Active   tirzepatide (Mounjaro) 2.5 mg/0.5 mL pen injector [id number] Yes Inject 2.5 mg under the skin 1 (one) time per week. Fadumo Nixon MD  Active   tirzepatide, weight loss, (Zepbound) 2.5 mg/0.5 mL injection [id number]  Inject 2.5 mg under the skin every 7 days.   Patient not taking: Reported on 2/25/2025     Fadumo Nixon MD  Active   triamcinolone (Kenalog) 0.1 % cream [id number]  Apply topically 2 times a day.   Patient not taking: Reported on 2/25/2025    Historical Provider, MD  Active   triamterene-hydrochlorothiazid (Maxzide-25) 37.5-25 mg tablet [id number] Yes TAKE 1 TABLET DAILY Fadumo Nixon MD  Active   turmeric 400 mg capsule [id number] Yes Take by mouth. Historical Provider, MD  Active                    Allergies   Allergen Reactions    Banana Angioedema    Cantaloupe Angioedema    Watermelon Angioedema       Social History     Socioeconomic History    Marital status:      Spouse name: Not on file    Number of children: Not on file    Years of education: Not on file    Highest education level: Not on file   Occupational History    Not on file   Tobacco Use    Smoking status: Never    Smokeless tobacco: Never   Substance and Sexual Activity    Alcohol use: Not Currently    Drug use: Never    Sexual activity: Not on file   Other Topics Concern    Not on file   Social History Narrative    Not on file     Social Drivers of Health     Financial Resource Strain: Not on file   Food Insecurity: Not on file   Transportation Needs: Not on file   Physical Activity: Not on file   Stress: Not on file   Social Connections: Not on file   Intimate Partner Violence: Not on file   Housing Stability: Not on file       Past Surgical History:   Procedure Laterality Date    COLONOSCOPY W/ POLYPECTOMY  10/2020    mucosal fold-due 2025    SKIN SURGERY  2016    MOHS x 5-last 2016    TONSILLECTOMY  1955    TOTAL ABDOMINAL HYSTERECTOMY W/ BILATERAL SALPINGOOPHORECTOMY  01/2022    endometrial CA       No results found.       Scribe Attestation  By signing my name below, IKayy Scribe   attest that this documentation has been prepared under the direction and in the presence of Carlos Hussein MD.

## 2025-03-27 ENCOUNTER — APPOINTMENT (OUTPATIENT)
Dept: ORTHOPEDIC SURGERY | Facility: CLINIC | Age: 75
End: 2025-03-27
Payer: MEDICARE

## 2025-03-27 DIAGNOSIS — M17.0 PRIMARY OSTEOARTHRITIS OF BOTH KNEES: Primary | ICD-10-CM

## 2025-03-27 PROCEDURE — 20610 DRAIN/INJ JOINT/BURSA W/O US: CPT | Performed by: ORTHOPAEDIC SURGERY

## 2025-03-27 PROCEDURE — 99213 OFFICE O/P EST LOW 20 MIN: CPT | Performed by: ORTHOPAEDIC SURGERY

## 2025-03-27 RX ORDER — TRIAMCINOLONE ACETONIDE 40 MG/ML
2.5 INJECTION, SUSPENSION INTRA-ARTICULAR; INTRAMUSCULAR
Status: COMPLETED | OUTPATIENT
Start: 2025-03-27 | End: 2025-03-27

## 2025-03-27 RX ORDER — LIDOCAINE HYDROCHLORIDE 10 MG/ML
8 INJECTION, SOLUTION INFILTRATION; PERINEURAL
Status: COMPLETED | OUTPATIENT
Start: 2025-03-27 | End: 2025-03-27

## 2025-03-27 RX ORDER — TIRZEPATIDE 7.5 MG/.5ML
7.5 INJECTION, SOLUTION SUBCUTANEOUS WEEKLY
Qty: 2 ML | Refills: 2 | Status: SHIPPED | OUTPATIENT
Start: 2025-03-27

## 2025-03-27 RX ADMIN — TRIAMCINOLONE ACETONIDE 2.5 MG: 40 INJECTION, SUSPENSION INTRA-ARTICULAR; INTRAMUSCULAR at 09:54

## 2025-03-27 RX ADMIN — LIDOCAINE HYDROCHLORIDE 8 ML: 10 INJECTION, SOLUTION INFILTRATION; PERINEURAL at 09:54

## 2025-04-25 ENCOUNTER — TELEPHONE (OUTPATIENT)
Dept: PRIMARY CARE | Facility: CLINIC | Age: 75
End: 2025-04-25
Payer: MEDICARE

## 2025-04-25 DIAGNOSIS — R73.03 PREDIABETES: Primary | ICD-10-CM

## 2025-04-25 RX ORDER — TIRZEPATIDE 10 MG/.5ML
10 INJECTION, SOLUTION SUBCUTANEOUS WEEKLY
Qty: 2 ML | Refills: 3 | Status: SHIPPED | OUTPATIENT
Start: 2025-04-25

## 2025-05-23 ENCOUNTER — PATIENT MESSAGE (OUTPATIENT)
Dept: PRIMARY CARE | Facility: CLINIC | Age: 75
End: 2025-05-23
Payer: MEDICARE

## 2025-05-23 DIAGNOSIS — R73.03 PREDIABETES: ICD-10-CM

## 2025-05-23 DIAGNOSIS — R73.9 HYPERGLYCEMIA: ICD-10-CM

## 2025-05-28 RX ORDER — TIRZEPATIDE 12.5 MG/.5ML
12.5 INJECTION, SOLUTION SUBCUTANEOUS WEEKLY
Qty: 2 ML | Refills: 2 | Status: SHIPPED | OUTPATIENT
Start: 2025-05-28

## 2025-06-26 ENCOUNTER — APPOINTMENT (OUTPATIENT)
Dept: ORTHOPEDIC SURGERY | Facility: CLINIC | Age: 75
End: 2025-06-26
Payer: MEDICARE

## 2025-06-26 DIAGNOSIS — M17.0 PRIMARY OSTEOARTHRITIS OF BOTH KNEES: Primary | ICD-10-CM

## 2025-06-26 PROCEDURE — 20610 DRAIN/INJ JOINT/BURSA W/O US: CPT | Performed by: PHYSICIAN ASSISTANT

## 2025-06-26 PROCEDURE — 99213 OFFICE O/P EST LOW 20 MIN: CPT | Performed by: PHYSICIAN ASSISTANT

## 2025-06-26 RX ORDER — TRIAMCINOLONE ACETONIDE 40 MG/ML
40 INJECTION, SUSPENSION INTRA-ARTICULAR; INTRAMUSCULAR
Status: COMPLETED | OUTPATIENT
Start: 2025-06-26 | End: 2025-06-26

## 2025-06-26 RX ORDER — LIDOCAINE HYDROCHLORIDE 5 MG/ML
8 INJECTION, SOLUTION INFILTRATION; PERINEURAL
Status: COMPLETED | OUTPATIENT
Start: 2025-06-26 | End: 2025-06-26

## 2025-06-26 RX ADMIN — TRIAMCINOLONE ACETONIDE 40 MG: 40 INJECTION, SUSPENSION INTRA-ARTICULAR; INTRAMUSCULAR at 08:18

## 2025-06-26 RX ADMIN — LIDOCAINE HYDROCHLORIDE 8 ML: 5 INJECTION, SOLUTION INFILTRATION; PERINEURAL at 08:18

## 2025-06-26 NOTE — PROGRESS NOTES
Chief Complaint   Patient presents with    Left Knee - Follow-up     B/L primary OA, s/p CSI 3/27/2025    Right Knee - Follow-up     B/L primary OA, s/p CSI 3/27/2025     History of Present Illness:  6/26/2025: Patient returns with bilateral knee pain. We gave her bilateral knee injections on 3/27/25 which she states worked well until the last couple of weeks. She is closer to considering a knee replacement, but would like to get through the summer first.    3/27/25: We gave her a cortisone injection into the bilateral knee on 12/19/24. She got good relief with the last injection but they have started to wear off over the last few weeks.     12/19/24: We gave her bilateral cortisone injections on 09/26/24. They seem to be working well for her and give her about 3 months of relief. She asks about repeating those today as she is leaving to go to [place] with her family.     9/26/24: Bilateral knee cortisone injections on 06/06/24 provided relief for a little over 3 months. She would like repeat injections today. She is going to [place] December 28th to visit her grandkids.     6/6/24: She completed a gel series in her right knee on 05/09/24. She last had a cortisone injection in her right knee on 10/19/24. She states that she got more relief out of the cortisone injections than she did gel. Her left knee is now bothering her and she would like an x-ray of it at some point. She has a history of prediabetes.     3/28/24: Patient here today unfortunately starting to bother her again. We done 2 separate cortisone injections over the last year for her mild arthritis. They do work fairly well and they last for just about 5 months or so. General well options, getting gel injections as viscosupplementation.     Imaging:  Radiographs Knee: No acute fractures or dislocations.  Shows grade 4 medial compartment arthritis in the right knee.     Assessment:   Bilateral knee arthritis flare    Plan:  We reviewed the role of  imaging, physical therapy, injections and the time frame to healing and correlation with outcome.  Continue ice and home exercise.   Cortisone injection in bilateral knees today.   Updated bilateral knee x-rays at her next visit  Follow up as needed    L Inj/Asp: bilateral knee on 6/26/2025 8:18 AM  Indications: pain  Details: 22 G needle, anterolateral approach  Medications (Right): 40 mg triamcinolone acetonide 40 mg/mL; 8 mL lidocaine 5 mg/mL (0.5 %)  Medications (Left): 40 mg triamcinolone acetonide 40 mg/mL; 8 mL lidocaine 5 mg/mL (0.5 %)  Outcome: tolerated well, no immediate complications  Procedure, treatment alternatives, risks and benefits explained, specific risks discussed. Consent was given by the patient. Immediately prior to procedure a time out was called to verify the correct patient, procedure, equipment, support staff and site/side marked as required. Patient was prepped and draped in the usual sterile fashion.          Physical Exam:  Well-nourished, well-developed. No acute distress. Alert and oriented x3. Responds appropriately to questioning. Good mood. Normal affect.  Physical Exam  Bilateral Knee:  ROM: 110  Pain along medial joint line  Positive Rocky´s test/PositiveApley Grind  Neurovascular exam normal distally  Palpable pedal pulse and good cap refill     Review of Systems:  GENERAL: Negative for malaise, significant weight loss, fever  MUSCULOSKELETAL: See HPI  NEURO:  Negative     Past Medical History:   Diagnosis Date    BCC (basal cell carcinoma)     Endometrial cancer (Multi) 01/31/2024 2022    Endometrial hyperplasia with atypia     History of mammogram 06/21/2024    cat 1       Medication Documentation Review Audit       Reviewed by Princess Simeon MA (Medical Assistant) on 02/25/25 at 0920      Medication Order Taking? Sig Documenting Provider Last Dose Status   acetaminophen (Tylenol) 500 mg tablet [id number] Yes Take 1 tablet (500 mg) by mouth every 4 hours if needed for  mild pain (1 - 3). Historical Provider, MD  Active   ammonium lactate (Lac-Hydrin) 12 % lotion [id number]  Apply topically once daily.   Patient not taking: Reported on 2/25/2025    Historical Provider, MD  Active   aspirin 81 mg EC tablet [id number] Yes Take 1 tablet (81 mg) by mouth once daily. Historical Provider, MD  Active   atenolol (Tenormin) 50 mg tablet [id number] Yes TAKE 1 TABLET DAILY Fadumo Nixon MD  Active   atorvastatin (Lipitor) 80 mg tablet [id number] Yes TAKE 1 TABLET DAILY Fadumo Nixon MD  Active   cholecalciferol (Vitamin D-3) 50 mcg (2,000 unit) capsule [id number]  Take 1 capsule (50 mcg) by mouth.   Patient not taking: Reported on 2/25/2025    Historical Provider, MD  Active   coenzyme Q-10 100 mg capsule [id number] Yes Take 1 capsule (100 mg) by mouth once daily. Historical Provider, MD  Active   empagliflozin (Jardiance) 10 mg [id number] Yes Take 1 tablet (10 mg) by mouth once daily. Ildefonso Ayala DO  Active   escitalopram (Lexapro) 20 mg tablet [id number] Yes TAKE 1 TABLET DAILY Fadumo Nixon MD  Active   Foltanx RF 3 mg-35 mg-2 mg -90.314 mg capsule [id number]  Take 1 capsule by mouth every 12 hours.   Patient not taking: Reported on 2/25/2025    Historical Provider, MD  Active   Jardiance 10 mg [id number]  TAKE 1 TABLET DAILY   Patient not taking: Reported on 2/25/2025    Fadumo Nixon MD  Active   levothyroxine (Synthroid, Levoxyl) 88 mcg tablet [id number] Yes TAKE 1 TABLET EVERY MORNING BEFORE BREAKFAST ON AN EMPTY STOMACH Fadumo Nixon MD  Active   metFORMIN (Glucophage) 500 mg tablet [id number] Yes Take 1 tablet (500 mg) by mouth 2 times daily (morning and late afternoon). Fadumo Nixon MD  Active   multivitamin-min-iron-FA-vit K (Adults Multivitamin) 18 mg iron-400 mcg-25 mcg tablet [id number] Yes Take by mouth. Wilmer Provider, MD  Active   tirzepatide (Mounjaro) 2.5 mg/0.5 mL pen injector [id number] Yes Inject 2.5 mg under the skin 1 (one)  time per week. Fadumo Nixon MD  Active   tirzepatide, weight loss, (Zepbound) 2.5 mg/0.5 mL injection [id number]  Inject 2.5 mg under the skin every 7 days.   Patient not taking: Reported on 2/25/2025    Fadumo Nixon MD  Active   triamcinolone (Kenalog) 0.1 % cream [id number]  Apply topically 2 times a day.   Patient not taking: Reported on 2/25/2025    Historical Provider, MD  Active   triamterene-hydrochlorothiazid (Maxzide-25) 37.5-25 mg tablet [id number] Yes TAKE 1 TABLET DAILY Fadumo Nixon MD  Active   turmeric 400 mg capsule [id number] Yes Take by mouth. Historical Provider, MD  Active                    Allergies   Allergen Reactions    Banana Angioedema    Cantaloupe Angioedema    Watermelon Angioedema       Social History     Socioeconomic History    Marital status:      Spouse name: Not on file    Number of children: Not on file    Years of education: Not on file    Highest education level: Not on file   Occupational History    Not on file   Tobacco Use    Smoking status: Never    Smokeless tobacco: Never   Substance and Sexual Activity    Alcohol use: Not Currently    Drug use: Never    Sexual activity: Not on file   Other Topics Concern    Not on file   Social History Narrative    Not on file     Social Drivers of Health     Financial Resource Strain: Not on file   Food Insecurity: Not on file   Transportation Needs: Not on file   Physical Activity: Not on file   Stress: Not on file   Social Connections: Not on file   Intimate Partner Violence: Not on file   Housing Stability: Not on file       Past Surgical History:   Procedure Laterality Date    COLONOSCOPY W/ POLYPECTOMY  10/2020    mucosal fold-due 2025    SKIN SURGERY  2016    MOHS x 5-last 2016    TONSILLECTOMY  1955    TOTAL ABDOMINAL HYSTERECTOMY W/ BILATERAL SALPINGOOPHORECTOMY  01/2022    endometrial CA           English

## 2025-06-30 DIAGNOSIS — I10 ESSENTIAL HYPERTENSION: ICD-10-CM

## 2025-06-30 RX ORDER — ATENOLOL 50 MG/1
50 TABLET ORAL DAILY
Qty: 90 TABLET | Refills: 3 | Status: SHIPPED | OUTPATIENT
Start: 2025-06-30

## 2025-07-08 DIAGNOSIS — E03.8 OTHER SPECIFIED HYPOTHYROIDISM: ICD-10-CM

## 2025-07-08 RX ORDER — LEVOTHYROXINE SODIUM 88 UG/1
88 TABLET ORAL
Qty: 90 TABLET | Refills: 3 | Status: SHIPPED | OUTPATIENT
Start: 2025-07-08

## 2025-07-09 DIAGNOSIS — R73.03 PREDIABETES: ICD-10-CM

## 2025-07-09 RX ORDER — METFORMIN HYDROCHLORIDE 500 MG/1
TABLET ORAL
Qty: 180 TABLET | Refills: 3 | Status: SHIPPED | OUTPATIENT
Start: 2025-07-09

## 2025-07-24 LAB
ALBUMIN SERPL-MCNC: 4.4 G/DL (ref 3.6–5.1)
ALBUMIN/CREAT UR: 23 MG/G CREAT
ALP SERPL-CCNC: 116 U/L (ref 37–153)
ALT SERPL-CCNC: 22 U/L (ref 6–29)
ANION GAP SERPL CALCULATED.4IONS-SCNC: 12 MMOL/L (CALC) (ref 7–17)
AST SERPL-CCNC: 15 U/L (ref 10–35)
BILIRUB SERPL-MCNC: 0.6 MG/DL (ref 0.2–1.2)
BUN SERPL-MCNC: 25 MG/DL (ref 7–25)
CALCIUM SERPL-MCNC: 10.4 MG/DL (ref 8.6–10.4)
CHLORIDE SERPL-SCNC: 98 MMOL/L (ref 98–110)
CO2 SERPL-SCNC: 28 MMOL/L (ref 20–32)
CREAT SERPL-MCNC: 1.07 MG/DL (ref 0.6–1)
CREAT UR-MCNC: 78 MG/DL (ref 20–275)
EGFRCR SERPLBLD CKD-EPI 2021: 55 ML/MIN/1.73M2
EST. AVERAGE GLUCOSE BLD GHB EST-MCNC: 123 MG/DL
EST. AVERAGE GLUCOSE BLD GHB EST-SCNC: 6.8 MMOL/L
GLUCOSE SERPL-MCNC: 124 MG/DL (ref 65–99)
HBA1C MFR BLD: 5.9 %
MICROALBUMIN UR-MCNC: 1.8 MG/DL
POTASSIUM SERPL-SCNC: 4.1 MMOL/L (ref 3.5–5.3)
PROT SERPL-MCNC: 6.8 G/DL (ref 6.1–8.1)
SODIUM SERPL-SCNC: 138 MMOL/L (ref 135–146)

## 2025-07-29 ENCOUNTER — APPOINTMENT (OUTPATIENT)
Dept: PRIMARY CARE | Facility: CLINIC | Age: 75
End: 2025-07-29
Payer: MEDICARE

## 2025-07-29 VITALS
SYSTOLIC BLOOD PRESSURE: 122 MMHG | HEIGHT: 68 IN | HEART RATE: 68 BPM | OXYGEN SATURATION: 98 % | RESPIRATION RATE: 16 BRPM | WEIGHT: 267 LBS | BODY MASS INDEX: 40.47 KG/M2 | TEMPERATURE: 97.2 F | DIASTOLIC BLOOD PRESSURE: 64 MMHG

## 2025-07-29 DIAGNOSIS — Z12.11 COLON CANCER SCREENING: ICD-10-CM

## 2025-07-29 DIAGNOSIS — E78.2 MIXED HYPERLIPIDEMIA: ICD-10-CM

## 2025-07-29 DIAGNOSIS — F41.9 ANXIETY AND DEPRESSION: ICD-10-CM

## 2025-07-29 DIAGNOSIS — F32.A ANXIETY AND DEPRESSION: ICD-10-CM

## 2025-07-29 DIAGNOSIS — Z12.31 ENCOUNTER FOR SCREENING MAMMOGRAM FOR MALIGNANT NEOPLASM OF BREAST: ICD-10-CM

## 2025-07-29 DIAGNOSIS — E03.8 OTHER SPECIFIED HYPOTHYROIDISM: ICD-10-CM

## 2025-07-29 DIAGNOSIS — R73.03 PREDIABETES: ICD-10-CM

## 2025-07-29 DIAGNOSIS — N18.30 STAGE 3 CHRONIC KIDNEY DISEASE, UNSPECIFIED WHETHER STAGE 3A OR 3B CKD (MULTI): ICD-10-CM

## 2025-07-29 DIAGNOSIS — I10 ESSENTIAL HYPERTENSION: ICD-10-CM

## 2025-07-29 PROCEDURE — 1159F MED LIST DOCD IN RCRD: CPT | Performed by: FAMILY MEDICINE

## 2025-07-29 PROCEDURE — 1036F TOBACCO NON-USER: CPT | Performed by: FAMILY MEDICINE

## 2025-07-29 PROCEDURE — 3008F BODY MASS INDEX DOCD: CPT | Performed by: FAMILY MEDICINE

## 2025-07-29 PROCEDURE — 3078F DIAST BP <80 MM HG: CPT | Performed by: FAMILY MEDICINE

## 2025-07-29 PROCEDURE — 3074F SYST BP LT 130 MM HG: CPT | Performed by: FAMILY MEDICINE

## 2025-07-29 PROCEDURE — 1160F RVW MEDS BY RX/DR IN RCRD: CPT | Performed by: FAMILY MEDICINE

## 2025-07-29 PROCEDURE — G2211 COMPLEX E/M VISIT ADD ON: HCPCS | Performed by: FAMILY MEDICINE

## 2025-07-29 PROCEDURE — 99214 OFFICE O/P EST MOD 30 MIN: CPT | Performed by: FAMILY MEDICINE

## 2025-07-29 NOTE — PROGRESS NOTES
Covid vax: UTD  Flu: UTD  Pneumo: UTD  RSV: UTD  Shingles: UTD    CRC: 10/2020-due 2025-ordered  Mammogram: 6/21/2024-ordered  Pap: hysterectomy  Lmp: hysterectomy

## 2025-07-29 NOTE — PROGRESS NOTES
"Subjective   Patient ID: Emmy Chang is a 75 y.o. female who presents for   Chief Complaint   Patient presents with    Prediabetes    Hypertension    Chronic Kidney Disease    Hyperlipidemia    Hypothyroidism    Depression    Anxiety   Covid vax: UTD  Flu: UTD  Pneumo: UTD  RSV: UTD  Shingles: UTD     CRC: 10/2020-due 2025-ordered  Mammogram: 6/21/2024-ordered  Pap: hysterectomy  Lmp: hysterectomy  Hba1c 5.9  Down nearly 30#  Occ bm issues-not nearly constipated aware of rba glp1    HPI  Problem List[1]    Surgical History[2]    Review of Systems  This patient has  NO history of seizures/ CAD or CVA    NO history of recent Covid nor flu symptoms,    NO Fever nor chills today,    NO Chest pain, shortness of breath nor paroxysmal nocturnal dyspnea,  NO Nausea, vomiting, nor diarrhea,  NO Hematochezia nor melena,  NO Dysuria, hematuria, nor new incontinence issues  NO new severe headaches nor neurological complaints,  NO new issues with anxiety nor depression nor new psychiatric complaints,  NO suicidal nor homicidal ideations.     OBJECTIVE:  /64   Pulse 68   Temp 36.2 °C (97.2 °F) (Temporal)   Resp 16   Ht 1.727 m (5' 8\")   Wt 121 kg (267 lb)   LMP  (LMP Unknown)   SpO2 98%   BMI 40.60 kg/m²      General:  alert, oriented, no acute distress.  No obvious skin rashes noted.       Mood is pleasant,  no signs of emotional distress.   Not appearing intoxicated or altered.   No voiced delusions,   Normal, appropriate behavior.    HEENT: Normocephalic, atraumatic,   Pupils round, reactive to light  Extraocular motions intact and wnl  Tympanic membranes normal    Neck: no nuchal rigidity  No masses palpable.  No carotid bruits.  No thyromegaly.    Respiratory: Equal breath sounds  No wheezes,    rales,    nor rhonchi  No respiratory distress.    Heart: Regular rate and rhythm, no    murmurs  no rubs/gallops    Abdomen: no masses palpable, nontender, no rebound nor guarding.ovwt but better!    Extremities: " NO cyanosis noted, no clubbing.   No edema noted.  2+dorsalis pedis pulses.    Normal-not antalgic, steady gait.  No foot lesions complains of numb feet    Orders Only on 07/23/2025   Component Date Value Ref Range Status    CREATININE, RANDOM URINE 07/23/2025 78  20 - 275 mg/dL Final    ALBUMIN, URINE 07/23/2025 1.8  See Note: mg/dL Final    Comment: Reference Range:    Reference Range  Not established      ALBUMIN/CREATININE RATIO, RANDOM U* 07/23/2025 23  <30 mg/g creat Final    Comment:    The ADA defines abnormalities in albumin  excretion as follows:     Albuminuria Category        Result (mg/g creatinine)     Normal to Mildly increased   <30  Moderately increased            Severely increased           > OR = 300     The ADA recommends that at least two of three  specimens collected within a 3-6 month period be  abnormal before considering a patient to be  within a diagnostic category.          Assessment/Plan     Problem List Items Addressed This Visit       Anxiety and depression    Relevant Orders    CBC and Auto Differential    Comprehensive Metabolic Panel    Hemoglobin A1C    Lipid Panel    Thyroid Stimulating Hormone    Thyroxine, Free    CKD (chronic kidney disease) stage 3, GFR 30-59 ml/min (Multi)    Relevant Orders    CBC and Auto Differential    Comprehensive Metabolic Panel    Hemoglobin A1C    Lipid Panel    Thyroid Stimulating Hormone    Thyroxine, Free    Essential hypertension    Relevant Orders    CBC and Auto Differential    Comprehensive Metabolic Panel    Hemoglobin A1C    Lipid Panel    Thyroid Stimulating Hormone    Thyroxine, Free    Mixed hyperlipidemia    Relevant Orders    CBC and Auto Differential    Comprehensive Metabolic Panel    Hemoglobin A1C    Lipid Panel    Thyroid Stimulating Hormone    Thyroxine, Free    Other specified hypothyroidism    Relevant Orders    CBC and Auto Differential    Comprehensive Metabolic Panel    Hemoglobin A1C    Lipid Panel    Thyroid  Stimulating Hormone    Thyroxine, Free    Prediabetes    Relevant Orders    CBC and Auto Differential    Comprehensive Metabolic Panel    Hemoglobin A1C    Lipid Panel    Thyroid Stimulating Hormone    Thyroxine, Free     Other Visit Diagnoses         Encounter for screening mammogram for malignant neoplasm of breast        Relevant Orders    BI mammo bilateral screening tomosynthesis    CBC and Auto Differential    Comprehensive Metabolic Panel    Hemoglobin A1C    Lipid Panel    Thyroid Stimulating Hormone    Thyroxine, Free      Colon cancer screening        Relevant Orders    Colonoscopy Screening; Average Risk Patient    CBC and Auto Differential    Comprehensive Metabolic Panel    Hemoglobin A1C    Lipid Panel    Thyroid Stimulating Hormone    Thyroxine, Free            Emmy Chang -be aware that any referrals discussed should be placed today or tests/labs ordered should result in prompt scheduling today.   If not done today-then a phone call for scheduling is expected in a timely manner(within 2 weeks).   If testing is to be done-a result should be available to patient within 2 weeks time unless otherwise specified.   You, the patient or caregiver, are responsible for making sure what was discussed is actually scheduled and completed.  If suboptimal understanding of results of tests or referral reason-a follow up appointment with me should be made.  If above does NOT occur-you are to connect with us for an explanation.    Follow up at next scheduled visit -as planned or directed today.  Sooner if new or unresolved issues of concern.    Emmy Chang We know you have a choice for your health care, THANK YOU for choosing  and [facility].  We APPRECIATE YOU.  Sincerely,   Fadumo Nixon MD   (dr. Abdul)             [1]   Patient Active Problem List  Diagnosis    Adenomatous polyp of descending colon    Anemia    Anxiety and depression    Osteoarthritis    CKD (chronic kidney disease) stage  3, GFR 30-59 ml/min (Multi)    Degenerative cervical disc    Essential hypertension    Hematuria    History of malignant neoplasm of skin    Hyperglycemia    Mixed hyperlipidemia    Other specified hypothyroidism    Polyneuropathy    Prediabetes    Scalp psoriasis    Spinal stenosis of lumbar region    Hx of malignant neoplasm of endometrium    Spondylosis of lumbar spine    Paresthesia of skin    Stage 3 chronic kidney disease (Multi)    Arthropathy    Atypical mole    Bilateral sensorineural hearing loss    Class 3 severe obesity due to excess calories with body mass index (BMI) of 40.0 to 44.9 in adult    Lumbar radiculopathy, chronic    Morbid obesity with BMI of 40.0-44.9, adult (Multi)    Tinnitus    Actinic keratitis    Encounter to discuss test results    Agatston coronary artery calcium score less than 100   [2]   Past Surgical History:  Procedure Laterality Date    COLONOSCOPY W/ POLYPECTOMY  10/2020    mucosal fold-due 2025    SKIN SURGERY  2016    MOHS x 5-last 2016    TONSILLECTOMY  1955    TOTAL ABDOMINAL HYSTERECTOMY W/ BILATERAL SALPINGOOPHORECTOMY  01/2022    endometrial CA

## 2025-08-20 DIAGNOSIS — R73.03 PREDIABETES: ICD-10-CM

## 2025-08-20 RX ORDER — TIRZEPATIDE 12.5 MG/.5ML
INJECTION, SOLUTION SUBCUTANEOUS
Qty: 2 ML | Refills: 1 | Status: SHIPPED | OUTPATIENT
Start: 2025-08-20

## 2025-08-25 DIAGNOSIS — F41.9 ANXIETY AND DEPRESSION: ICD-10-CM

## 2025-08-25 DIAGNOSIS — F32.A ANXIETY AND DEPRESSION: ICD-10-CM

## 2025-08-25 RX ORDER — ESCITALOPRAM OXALATE 20 MG/1
20 TABLET ORAL DAILY
Qty: 90 TABLET | Refills: 3 | Status: SHIPPED | OUTPATIENT
Start: 2025-08-25

## 2025-08-27 ENCOUNTER — OFFICE VISIT (OUTPATIENT)
Dept: GYNECOLOGIC ONCOLOGY | Facility: CLINIC | Age: 75
End: 2025-08-27
Payer: MEDICARE

## 2025-08-27 VITALS
HEART RATE: 85 BPM | WEIGHT: 263.01 LBS | SYSTOLIC BLOOD PRESSURE: 110 MMHG | BODY MASS INDEX: 39.99 KG/M2 | RESPIRATION RATE: 16 BRPM | TEMPERATURE: 97 F | OXYGEN SATURATION: 95 % | DIASTOLIC BLOOD PRESSURE: 73 MMHG

## 2025-08-27 DIAGNOSIS — C54.1 ENDOMETRIAL CANCER (MULTI): Primary | ICD-10-CM

## 2025-08-27 PROCEDURE — 1126F AMNT PAIN NOTED NONE PRSNT: CPT | Performed by: NURSE PRACTITIONER

## 2025-08-27 PROCEDURE — 99213 OFFICE O/P EST LOW 20 MIN: CPT | Performed by: NURSE PRACTITIONER

## 2025-08-27 PROCEDURE — 1036F TOBACCO NON-USER: CPT | Performed by: NURSE PRACTITIONER

## 2025-08-27 PROCEDURE — 1159F MED LIST DOCD IN RCRD: CPT | Performed by: NURSE PRACTITIONER

## 2025-08-27 PROCEDURE — 3074F SYST BP LT 130 MM HG: CPT | Performed by: NURSE PRACTITIONER

## 2025-08-27 PROCEDURE — 3078F DIAST BP <80 MM HG: CPT | Performed by: NURSE PRACTITIONER

## 2025-08-27 ASSESSMENT — PAIN SCALES - GENERAL: PAINLEVEL_OUTOF10: 0-NO PAIN

## 2025-09-25 ENCOUNTER — APPOINTMENT (OUTPATIENT)
Dept: ORTHOPEDIC SURGERY | Facility: CLINIC | Age: 75
End: 2025-09-25
Payer: MEDICARE

## 2026-01-30 ENCOUNTER — APPOINTMENT (OUTPATIENT)
Dept: PRIMARY CARE | Facility: CLINIC | Age: 76
End: 2026-01-30
Payer: MEDICARE

## (undated) DEVICE — TUBE SET 96 MM 64 MM H2O PERISTALTIC STD AUX CHANNEL

## (undated) DEVICE — Device: Brand: ENDO SMARTCAP

## (undated) DEVICE — SINGLE PORT MANIFOLD: Brand: NEPTUNE 2

## (undated) DEVICE — ADAPTER FLSH PMP FLD MGMT GI IRRIG OFP 2 DISPOSABLE

## (undated) DEVICE — BRUSH ENDO CLN L90.5IN SHTH DIA1.7MM BRIST DIA5-7MM 2-6MM

## (undated) DEVICE — GLOVE ORANGE PI 8 1/2   MSG9085

## (undated) DEVICE — FORCEPS BX L240CM JAW DIA2.8MM L CAP W/ NDL MIC MESH TOOTH

## (undated) DEVICE — ENDO CARRY-ON PROCEDURE KIT: Brand: ENDO CARRY-ON PROCEDURE KIT

## (undated) DEVICE — TUBING, SUCTION, 1/4" X 10', STRAIGHT: Brand: MEDLINE